# Patient Record
Sex: MALE | Race: WHITE | Employment: UNEMPLOYED | URBAN - METROPOLITAN AREA
[De-identification: names, ages, dates, MRNs, and addresses within clinical notes are randomized per-mention and may not be internally consistent; named-entity substitution may affect disease eponyms.]

---

## 2020-01-19 ENCOUNTER — APPOINTMENT (OUTPATIENT)
Dept: GENERAL RADIOLOGY | Age: 55
End: 2020-01-19
Payer: COMMERCIAL

## 2020-01-19 ENCOUNTER — HOSPITAL ENCOUNTER (OUTPATIENT)
Age: 55
Setting detail: OBSERVATION
Discharge: HOME | End: 2020-01-23
Attending: EMERGENCY MEDICINE | Admitting: HOSPITALIST
Payer: COMMERCIAL

## 2020-01-19 PROCEDURE — 99285 EMERGENCY DEPT VISIT HI MDM: CPT

## 2020-01-19 PROCEDURE — 71045 X-RAY EXAM CHEST 1 VIEW: CPT

## 2020-01-19 RX ORDER — ACETAMINOPHEN 500 MG
1000 TABLET ORAL ONCE
Status: COMPLETED | OUTPATIENT
Start: 2020-01-19 | End: 2020-01-20

## 2020-01-20 ENCOUNTER — APPOINTMENT (OUTPATIENT)
Dept: MRI IMAGING | Age: 55
End: 2020-01-20
Payer: COMMERCIAL

## 2020-01-20 ENCOUNTER — APPOINTMENT (OUTPATIENT)
Dept: CT IMAGING | Age: 55
End: 2020-01-20
Payer: COMMERCIAL

## 2020-01-20 PROBLEM — G45.9 TIA (TRANSIENT ISCHEMIC ATTACK): Status: ACTIVE | Noted: 2020-01-20

## 2020-01-20 PROBLEM — Z78.9 VEGETARIAN: Status: ACTIVE | Noted: 2020-01-20

## 2020-01-20 PROBLEM — R51.9 HEADACHE: Status: ACTIVE | Noted: 2020-01-20

## 2020-01-20 PROBLEM — R47.81 SLURRED SPEECH: Status: ACTIVE | Noted: 2020-01-20

## 2020-01-20 PROBLEM — R94.31 ABNORMAL EKG: Status: ACTIVE | Noted: 2020-01-20

## 2020-01-20 PROBLEM — I10 HYPERTENSION: Status: ACTIVE | Noted: 2020-01-20

## 2020-01-20 LAB
ALBUMIN SERPL-MCNC: 4.2 G/DL (ref 3.5–5.2)
ALP BLD-CCNC: 86 U/L (ref 40–129)
ALT SERPL-CCNC: 11 U/L (ref 0–40)
ANION GAP SERPL CALCULATED.3IONS-SCNC: 11 MMOL/L (ref 7–16)
AST SERPL-CCNC: 17 U/L (ref 0–39)
BASOPHILS ABSOLUTE: 0.03 E9/L (ref 0–0.2)
BASOPHILS RELATIVE PERCENT: 0.3 % (ref 0–2)
BILIRUB SERPL-MCNC: 1 MG/DL (ref 0–1.2)
BUN BLDV-MCNC: 19 MG/DL (ref 6–20)
CALCIUM SERPL-MCNC: 9.3 MG/DL (ref 8.6–10.2)
CHLORIDE BLD-SCNC: 108 MMOL/L (ref 98–107)
CHOLESTEROL, FASTING: 172 MG/DL (ref 0–199)
CO2: 24 MMOL/L (ref 22–29)
CREAT SERPL-MCNC: 0.9 MG/DL (ref 0.7–1.2)
EKG ATRIAL RATE: 79 BPM
EKG ATRIAL RATE: 85 BPM
EKG P AXIS: 48 DEGREES
EKG P-R INTERVAL: 162 MS
EKG P-R INTERVAL: 170 MS
EKG Q-T INTERVAL: 398 MS
EKG Q-T INTERVAL: 436 MS
EKG QRS DURATION: 124 MS
EKG QRS DURATION: 130 MS
EKG QTC CALCULATION (BAZETT): 473 MS
EKG QTC CALCULATION (BAZETT): 499 MS
EKG R AXIS: -148 DEGREES
EKG R AXIS: -34 DEGREES
EKG T AXIS: -130 DEGREES
EKG T AXIS: -50 DEGREES
EKG VENTRICULAR RATE: 79 BPM
EKG VENTRICULAR RATE: 85 BPM
EOSINOPHILS ABSOLUTE: 0.15 E9/L (ref 0.05–0.5)
EOSINOPHILS RELATIVE PERCENT: 1.7 % (ref 0–6)
GFR AFRICAN AMERICAN: >60
GFR NON-AFRICAN AMERICAN: >60 ML/MIN/1.73
GLUCOSE BLD-MCNC: 107 MG/DL (ref 74–99)
HBA1C MFR BLD: 5.3 % (ref 4–5.6)
HCT VFR BLD CALC: 48.4 % (ref 37–54)
HDLC SERPL-MCNC: 49 MG/DL
HEMOGLOBIN: 15.7 G/DL (ref 12.5–16.5)
IMMATURE GRANULOCYTES #: 0.02 E9/L
IMMATURE GRANULOCYTES %: 0.2 % (ref 0–5)
LDL CHOLESTEROL CALCULATED: 102 MG/DL (ref 0–99)
LV EF: 43 %
LVEF MODALITY: NORMAL
LYMPHOCYTES ABSOLUTE: 1.83 E9/L (ref 1.5–4)
LYMPHOCYTES RELATIVE PERCENT: 20.9 % (ref 20–42)
MCH RBC QN AUTO: 29.5 PG (ref 26–35)
MCHC RBC AUTO-ENTMCNC: 32.4 % (ref 32–34.5)
MCV RBC AUTO: 91 FL (ref 80–99.9)
METER GLUCOSE: 96 MG/DL (ref 74–99)
MONOCYTES ABSOLUTE: 0.74 E9/L (ref 0.1–0.95)
MONOCYTES RELATIVE PERCENT: 8.5 % (ref 2–12)
NEUTROPHILS ABSOLUTE: 5.98 E9/L (ref 1.8–7.3)
NEUTROPHILS RELATIVE PERCENT: 68.4 % (ref 43–80)
PDW BLD-RTO: 12.5 FL (ref 11.5–15)
PLATELET # BLD: 236 E9/L (ref 130–450)
PMV BLD AUTO: 10.8 FL (ref 7–12)
POTASSIUM REFLEX MAGNESIUM: 4.4 MMOL/L (ref 3.5–5)
RBC # BLD: 5.32 E12/L (ref 3.8–5.8)
SODIUM BLD-SCNC: 143 MMOL/L (ref 132–146)
TOTAL PROTEIN: 6.7 G/DL (ref 6.4–8.3)
TRIGLYCERIDE, FASTING: 106 MG/DL (ref 0–149)
TROPONIN: <0.01 NG/ML (ref 0–0.03)
TROPONIN: <0.01 NG/ML (ref 0–0.03)
VLDLC SERPL CALC-MCNC: 21 MG/DL
WBC # BLD: 8.8 E9/L (ref 4.5–11.5)

## 2020-01-20 PROCEDURE — 80053 COMPREHEN METABOLIC PANEL: CPT

## 2020-01-20 PROCEDURE — 80061 LIPID PANEL: CPT

## 2020-01-20 PROCEDURE — A9579 GAD-BASE MR CONTRAST NOS,1ML: HCPCS | Performed by: RADIOLOGY

## 2020-01-20 PROCEDURE — 6370000000 HC RX 637 (ALT 250 FOR IP)

## 2020-01-20 PROCEDURE — 99244 OFF/OP CNSLTJ NEW/EST MOD 40: CPT | Performed by: INTERNAL MEDICINE

## 2020-01-20 PROCEDURE — 96372 THER/PROPH/DIAG INJ SC/IM: CPT

## 2020-01-20 PROCEDURE — 70498 CT ANGIOGRAPHY NECK: CPT

## 2020-01-20 PROCEDURE — 70450 CT HEAD/BRAIN W/O DYE: CPT

## 2020-01-20 PROCEDURE — G0378 HOSPITAL OBSERVATION PER HR: HCPCS

## 2020-01-20 PROCEDURE — 6360000002 HC RX W HCPCS: Performed by: HOSPITALIST

## 2020-01-20 PROCEDURE — 70553 MRI BRAIN STEM W/O & W/DYE: CPT

## 2020-01-20 PROCEDURE — 93010 ELECTROCARDIOGRAM REPORT: CPT | Performed by: INTERNAL MEDICINE

## 2020-01-20 PROCEDURE — 97165 OT EVAL LOW COMPLEX 30 MIN: CPT

## 2020-01-20 PROCEDURE — 6370000000 HC RX 637 (ALT 250 FOR IP): Performed by: EMERGENCY MEDICINE

## 2020-01-20 PROCEDURE — 85025 COMPLETE CBC W/AUTO DIFF WBC: CPT

## 2020-01-20 PROCEDURE — 92523 SPEECH SOUND LANG COMPREHEN: CPT

## 2020-01-20 PROCEDURE — 2580000003 HC RX 258: Performed by: HOSPITALIST

## 2020-01-20 PROCEDURE — 36415 COLL VENOUS BLD VENIPUNCTURE: CPT

## 2020-01-20 PROCEDURE — 84484 ASSAY OF TROPONIN QUANT: CPT

## 2020-01-20 PROCEDURE — 82962 GLUCOSE BLOOD TEST: CPT

## 2020-01-20 PROCEDURE — 6360000004 HC RX CONTRAST MEDICATION: Performed by: RADIOLOGY

## 2020-01-20 PROCEDURE — 99218 PR INITIAL OBSERVATION CARE/DAY 30 MINUTES: CPT | Performed by: PSYCHIATRY & NEUROLOGY

## 2020-01-20 PROCEDURE — 93005 ELECTROCARDIOGRAM TRACING: CPT | Performed by: EMERGENCY MEDICINE

## 2020-01-20 PROCEDURE — 70496 CT ANGIOGRAPHY HEAD: CPT

## 2020-01-20 PROCEDURE — 93005 ELECTROCARDIOGRAM TRACING: CPT | Performed by: INTERNAL MEDICINE

## 2020-01-20 PROCEDURE — 83036 HEMOGLOBIN GLYCOSYLATED A1C: CPT

## 2020-01-20 PROCEDURE — 93306 TTE W/DOPPLER COMPLETE: CPT

## 2020-01-20 PROCEDURE — 6370000000 HC RX 637 (ALT 250 FOR IP): Performed by: HOSPITALIST

## 2020-01-20 RX ORDER — ASPIRIN 81 MG/1
TABLET, CHEWABLE ORAL
Status: COMPLETED
Start: 2020-01-20 | End: 2020-01-20

## 2020-01-20 RX ORDER — SODIUM CHLORIDE 0.9 % (FLUSH) 0.9 %
10 SYRINGE (ML) INJECTION EVERY 12 HOURS SCHEDULED
Status: DISCONTINUED | OUTPATIENT
Start: 2020-01-20 | End: 2020-01-23 | Stop reason: HOSPADM

## 2020-01-20 RX ORDER — ASPIRIN 81 MG/1
81 TABLET ORAL DAILY
Status: DISCONTINUED | OUTPATIENT
Start: 2020-01-20 | End: 2020-01-23 | Stop reason: HOSPADM

## 2020-01-20 RX ORDER — ONDANSETRON 2 MG/ML
4 INJECTION INTRAMUSCULAR; INTRAVENOUS EVERY 6 HOURS PRN
Status: DISCONTINUED | OUTPATIENT
Start: 2020-01-20 | End: 2020-01-23 | Stop reason: HOSPADM

## 2020-01-20 RX ORDER — SODIUM CHLORIDE 0.9 % (FLUSH) 0.9 %
10 SYRINGE (ML) INJECTION PRN
Status: DISCONTINUED | OUTPATIENT
Start: 2020-01-20 | End: 2020-01-20 | Stop reason: SDUPTHER

## 2020-01-20 RX ORDER — SODIUM CHLORIDE 0.9 % (FLUSH) 0.9 %
10 SYRINGE (ML) INJECTION EVERY 12 HOURS SCHEDULED
Status: DISCONTINUED | OUTPATIENT
Start: 2020-01-20 | End: 2020-01-20 | Stop reason: SDUPTHER

## 2020-01-20 RX ORDER — ATORVASTATIN CALCIUM 40 MG/1
40 TABLET, FILM COATED ORAL NIGHTLY
Status: DISCONTINUED | OUTPATIENT
Start: 2020-01-20 | End: 2020-01-23 | Stop reason: HOSPADM

## 2020-01-20 RX ORDER — SODIUM CHLORIDE 0.9 % (FLUSH) 0.9 %
10 SYRINGE (ML) INJECTION PRN
Status: DISCONTINUED | OUTPATIENT
Start: 2020-01-20 | End: 2020-01-23 | Stop reason: HOSPADM

## 2020-01-20 RX ORDER — ACETAMINOPHEN 325 MG/1
650 TABLET ORAL EVERY 4 HOURS PRN
Status: DISCONTINUED | OUTPATIENT
Start: 2020-01-20 | End: 2020-01-23 | Stop reason: HOSPADM

## 2020-01-20 RX ADMIN — GADOTERIDOL 20 ML: 279.3 INJECTION, SOLUTION INTRAVENOUS at 16:40

## 2020-01-20 RX ADMIN — ENOXAPARIN SODIUM 40 MG: 40 INJECTION SUBCUTANEOUS at 10:44

## 2020-01-20 RX ADMIN — Medication 10 ML: at 22:35

## 2020-01-20 RX ADMIN — ACETAMINOPHEN 1000 MG: 500 TABLET ORAL at 00:20

## 2020-01-20 RX ADMIN — IOPAMIDOL 60 ML: 755 INJECTION, SOLUTION INTRAVENOUS at 02:49

## 2020-01-20 RX ADMIN — ASPIRIN 81 MG CHEWABLE TABLET 81 MG: 81 TABLET CHEWABLE at 10:44

## 2020-01-20 RX ADMIN — ATORVASTATIN CALCIUM 40 MG: 40 TABLET, FILM COATED ORAL at 22:34

## 2020-01-20 RX ADMIN — ASPIRIN 81 MG: 81 TABLET ORAL at 10:50

## 2020-01-20 RX ADMIN — Medication 10 ML: at 10:45

## 2020-01-20 SDOH — HEALTH STABILITY: MENTAL HEALTH: HOW OFTEN DO YOU HAVE A DRINK CONTAINING ALCOHOL?: MONTHLY OR LESS

## 2020-01-20 ASSESSMENT — PAIN SCALES - GENERAL
PAINLEVEL_OUTOF10: 3
PAINLEVEL_OUTOF10: 0

## 2020-01-20 NOTE — ED PROVIDER NOTES
HPI:  1/19/20,   Time: 11:33 PM       Amanda Brown is a 49 y.o. male presenting to the ED for headache, beginning 1 day ago. The complaint has been persistent, moderate in severity, and worsened by nothing. Patient states that he feels as if he has been having TIAs. The patient states that he gets a pressure sensation in his head followed by slurred speech to where he cannot find the right words he becomes agitated confused and curses at his wife. The patient states that he has had these episodes on and off for the last 2 years with the last one occurring last night. He states that his symptoms usually lasts any from where from 1 hour to 2-hour and then they resolve and he becomes himself. Patient states that today he has had continuation of his headache therefore he came to the ED for further evaluation. Review of Systems:   Pertinent positives and negatives are stated within HPI, all other systems reviewed and are negative.          --------------------------------------------- PAST HISTORY ---------------------------------------------  Past Medical History:  has no past medical history on file. Past Surgical History:  has no past surgical history on file. Social History:  reports that he has never smoked. He has never used smokeless tobacco.    Family History: family history is not on file. The patients home medications have been reviewed. Allergies: Patient has no known allergies.         ---------------------------------------------------PHYSICAL EXAM--------------------------------------    Constitutional/General: Alert and oriented x3, well appearing, non toxic in NAD  Head: Normocephalic and atraumatic  Eyes: PERRL, EOMI, conjunctive normal, sclera non icteric  Mouth: Oropharynx clear, handling secretions, no trismus, no asymmetry of the posterior oropharynx or uvular edema  Neck: Supple, full ROM, non tender to palpation in the midline, no stridor, no crepitus, no meningeal signs  Respiratory: Lungs clear to auscultation bilaterally, no wheezes, rales, or rhonchi. Not in respiratory distress  Cardiovascular:  Regular rate. Regular rhythm. No murmurs, gallops, or rubs. 2+ distal pulses  GI:  Abdomen Soft, Non tender, Non distended. +BS. No organomegaly, no palpable masses,  No rebound, guarding, or rigidity. Musculoskeletal: Moves all extremities x 4. Warm and well perfused, no clubbing, cyanosis, or edema. Capillary refill <3 seconds  Integument: skin warm and dry. No rashes. Neurologic: GCS 15, no focal deficits, cranial nerves II to XII intact, symmetric strength 5/5 in the upper and lower extremities bilaterally  Psychiatric: Normal Affect    -------------------------------------------------- RESULTS -------------------------------------------------  I have personally reviewed all laboratory and imaging results for this patient. Results are listed below.      LABS:  Results for orders placed or performed during the hospital encounter of 01/19/20   CBC Auto Differential   Result Value Ref Range    WBC 8.8 4.5 - 11.5 E9/L    RBC 5.32 3.80 - 5.80 E12/L    Hemoglobin 15.7 12.5 - 16.5 g/dL    Hematocrit 48.4 37.0 - 54.0 %    MCV 91.0 80.0 - 99.9 fL    MCH 29.5 26.0 - 35.0 pg    MCHC 32.4 32.0 - 34.5 %    RDW 12.5 11.5 - 15.0 fL    Platelets 232 952 - 649 E9/L    MPV 10.8 7.0 - 12.0 fL    Neutrophils % 68.4 43.0 - 80.0 %    Immature Granulocytes % 0.2 0.0 - 5.0 %    Lymphocytes % 20.9 20.0 - 42.0 %    Monocytes % 8.5 2.0 - 12.0 %    Eosinophils % 1.7 0.0 - 6.0 %    Basophils % 0.3 0.0 - 2.0 %    Neutrophils Absolute 5.98 1.80 - 7.30 E9/L    Immature Granulocytes # 0.02 E9/L    Lymphocytes Absolute 1.83 1.50 - 4.00 E9/L    Monocytes Absolute 0.74 0.10 - 0.95 E9/L    Eosinophils Absolute 0.15 0.05 - 0.50 E9/L    Basophils Absolute 0.03 0.00 - 0.20 E9/L   Comprehensive Metabolic Panel w/ Reflex to MG   Result Value Ref Range    Sodium 143 132 - 146 mmol/L    Potassium reflex Magnesium 4.4 3.5 - 5.0 mmol/L    Chloride 108 (H) 98 - 107 mmol/L    CO2 24 22 - 29 mmol/L    Anion Gap 11 7 - 16 mmol/L    Glucose 107 (H) 74 - 99 mg/dL    BUN 19 6 - 20 mg/dL    CREATININE 0.9 0.7 - 1.2 mg/dL    GFR Non-African American >60 >=60 mL/min/1.73    GFR African American >60     Calcium 9.3 8.6 - 10.2 mg/dL    Total Protein 6.7 6.4 - 8.3 g/dL    Alb 4.2 3.5 - 5.2 g/dL    Total Bilirubin 1.0 0.0 - 1.2 mg/dL    Alkaline Phosphatase 86 40 - 129 U/L    ALT 11 0 - 40 U/L    AST 17 0 - 39 U/L   Troponin   Result Value Ref Range    Troponin <0.01 0.00 - 0.03 ng/mL   EKG 12 Lead   Result Value Ref Range    Ventricular Rate 85 BPM    Atrial Rate 85 BPM    P-R Interval 162 ms    QRS Duration 124 ms    Q-T Interval 398 ms    QTc Calculation (Bazett) 473 ms    R Axis -148 degrees    T Axis -50 degrees       RADIOLOGY:  Interpreted by Radiologist.  CT Head WO Contrast   Final Result   No acute intracranial process. This report has been electronically signed by Stacie Moore MD.      CTA Head W Contrast   Final Result   Negative head CTA. No evidence of intracranial large vessel stenosis or    occlusion. No evidence of aneurysm or AVM. This report has been electronically signed by Stacie Moore MD.      CTA Neck W Contrast   Final Result   Negative neck CTA. No evidence of great vessel stenosis or occlusion. No    evidence of dissection. COMMENT:    Reference per NASCET criteria for degree of stenosis: Mild: less than 50%    stenosis. Moderate: 50-69% stenosis. Severe: 70-94% stenosis. Near occlusion:    95-99% stenosis. This report has been electronically signed by Stacie Moore MD.      XR CHEST PORTABLE   Final Result   No acute cardiopulmonary process. EKG: This EKG is signed and interpreted by the EP. EKG shows normal sinus rhythm at 85 bpm.  Right superior axis deviation. T wave inversions noted in lead III as well as aVF. Patient also has T wave inversions in V3 V4 V5 V6. No significant ST elevation or depression. No STEMI.      ------------------------- NURSING NOTES AND VITALS REVIEWED ---------------------------   The nursing notes within the ED encounter and vital signs as below have been reviewed by myself. BP (!) 159/89   Pulse 84   Temp 97.4 °F (36.3 °C) (Temporal)   Resp 17   Ht 5' 10\" (1.778 m)   Wt 220 lb (99.8 kg)   SpO2 99%   BMI 31.57 kg/m²   Oxygen Saturation Interpretation: Normal    The patients available past medical records and past encounters were reviewed. ------------------------------ ED COURSE/MEDICAL DECISION MAKING----------------------  Medications   sodium chloride flush 0.9 % injection 10 mL (has no administration in time range)   sodium chloride flush 0.9 % injection 10 mL (has no administration in time range)   acetaminophen (TYLENOL) tablet 650 mg (has no administration in time range)   enoxaparin (LOVENOX) injection 40 mg (has no administration in time range)   acetaminophen (TYLENOL) tablet 1,000 mg (1,000 mg Oral Given 1/20/20 0020)   iopamidol (ISOVUE-370) 76 % injection 60 mL (60 mLs Intravenous Given 1/20/20 0249)         ED COURSE:       Medical Decision Making: This is a 30-year-old male presented to the ED for a headache and slurred speech. Arrival to the ED the patient had a NIH is 0 and had no neurological dysfunction. The patient went laboratory work which revealed a normal CBC. Normal chemistry. Negative troponin. EKG did show diffuse ST and T wave changes. CT as well as CTAs were unremarkable for any acute findings. Chest x-ray unremarkable. The patient currently denies any chest pain. The patient having questionable TIA events as well as an abnormal EKG the patient be admitted for further work-up. Case discussed with Dr. Malorie Armendariz who is agreed for admission.       This patient's ED course included: a personal history and physicial examination and re-evaluation prior to disposition    This patient has remained

## 2020-01-20 NOTE — PROGRESS NOTES
problem list, as listed below have been reviewed prior to initiation of this evaluation.      ADMITTING DIAGNOSIS: Slurred speech [R47.81]  Slurred speech [R47.81]     ACTIVE PROBLEM LIST:   Patient Active Problem List   Diagnosis    Slurred speech    Hypertension    Headache    TIA (transient ischemic attack)    Abnormal EKG    Vegetarian

## 2020-01-20 NOTE — CONSULTS
Cardiology consult  Dr. Dahlia Lott      Reason for Consult: Abnormal EKG  Requesting Physician: Nash Cornell MD  CHIEF COMPLAINT: Headache, slurred speech  History Obtained From: patient  HISTORY OF PRESENT ILLNESS:   Patient is a 49years old male with history of hypertension, GERD, was admitted to the hospital with headache and slurred speech, patient was found to have abnormal EKG, cardiology was consulted. Patient stated for the last couple of years has been having episodes headache described as pressure, associated symptoms included aphasia difficulty finding words, agitation, symptoms started suddenly, no aggravating or alleviating factors, he did not try any treatment for his symptoms, on the day of admission symptoms are significant enough that prompted him to seek medical attention, patient denies any chest pain, no shortness of breath, no lightheadedness, no dizziness, no palpitations, no pedal edema, no PND, no orthopnea, no syncope, no presyncopal episodes. Past Medical History:   Diagnosis Date    GERD (gastroesophageal reflux disease)     HTN (hypertension)     Vegetarian diet        History reviewed. No pertinent surgical history.       Current Facility-Administered Medications:     acetaminophen (TYLENOL) tablet 650 mg, 650 mg, Oral, Q4H PRN, Dio Daniels MD    enoxaparin (LOVENOX) injection 40 mg, 40 mg, Subcutaneous, Daily, Dio Daniels MD    atorvastatin (LIPITOR) tablet 40 mg, 40 mg, Oral, Nightly, Dio Daniels MD    sodium chloride flush 0.9 % injection 10 mL, 10 mL, Intravenous, 2 times per day, Dio Daniels MD    sodium chloride flush 0.9 % injection 10 mL, 10 mL, Intravenous, PRN, Dio Daniels MD    magnesium hydroxide (MILK OF MAGNESIA) 400 MG/5ML suspension 30 mL, 30 mL, Oral, Daily PRN, Dio Daniels MD    ondansetron (ZOFRAN) injection 4 mg, 4 mg, Intravenous, Q6H PRN, Dio Daniels MD    aspirin EC tablet 81 mg, 81 mg, Oral, Daily, Nash Cornell MD  [facility] per HPI  GASTROINTESTINAL:  negative for nausea, vomiting, diarrhea, constipation, pruritus and jaundice  GENITOURINARY:  negative for frequency, dysuria, nocturia, urinary incontinence and hesitancy  HEMATOLOGIC/LYMPHATIC:  negative for easy bruising, bleeding, lymphadenopathy and petechiae  ALLERGIC/IMMUNOLOGIC:  negative for urticaria, hay fever and angioedema  ENDOCRINE:  negative for heat intolerance, cold intolerance, tremor, hair loss and diabetic symptoms including neither polyuria nor polydipsia nor blurred vision  MUSCULOSKELETAL:  negative for  myalgias, arthralgias, joint swelling, stiff joints and decreased range of motion  NEUROLOGICAL:  negative for memory problems,visual disturbance, dysphagia, weakness and numbness      PHYSICAL EXAM:   CONSTITUTIONAL:  awake, alert, cooperative, no apparent distress, and appears stated age  EYES:  lids and lashes normal and pupils equal, round and reactive to light, anicteric sclerae  HEAD:  normocepalic, without obvious abnormality, atraumatic, pink, moist mucous membranes. NECK:  Supple, symmetrical, trachea midline, no adenopathy, thyroid symmetric, not enlarged and no tenderness, skin normal  HEMATOLOGIC/LYMPHATICS:  no cervical lymphadenopathy and no supraclavicular lymphadenopathy  LUNGS:  No increased work of breathing, good air exchange, clear to auscultation bilaterally, no crackles or wheezing  CARDIOVASCULAR:  Normal apical impulse, regular rate and rhythm, normal S1 and S2, no S3 or S4, 2/6 systolic murmur at the apex, no JVD, no carotid bruit, no pedal edema, good carotid upstroke bilaterally. ABDOMEN:  Soft, nontender, no masses, no hepatomegaly or splenomegaly, BS+  CHEST: nontender to palpation, expands symmetrically  MUSCULOSKELETAL:  No clubbing no cyanosis. there is no redness, warmth, or swelling of the joints  full range of motion noted  NEUROLOGIC:  Alert, awake,oriented x3, no focal neurologic deficit was appreciated  SKIN:  no bruising or vessel stenosis or occlusion. No    evidence of dissection. COMMENT:    Reference per NASCET criteria for degree of stenosis: Mild: less than 50%    stenosis. Moderate: 50-69% stenosis. Severe: 70-94% stenosis. Near occlusion:    95-99% stenosis. This report has been electronically signed by Merry Duvall MD.      XR CHEST PORTABLE   Final Result   No acute cardiopulmonary process. I have personally reviewed the laboratory, cardiac diagnostic and radiographic testing as outlined above:      IMPRESSION:  1. Abnormal EKG: Asymptomatic from cardiac standpoint, evaluation. 2.  Hypertension: Controlled  3. Headache and aphasia  4. Obesity: Low-calorie diet, increase physical activities and weight loss were all advised    RECOMMENDATIONS:   1. Will continue current treatment  2.  Echocardiogram  3. Further cardiac recommendations will be forthcoming pending his clinical course and diagnostic test findings    I have reviewed my findings and recommendations with patient    Thank you for the consult  Electronically signed by Keith Guaman MD on 1/20/2020 at 9:13 AM  NOTE: This report was transcribed using voice recognition software.  Every effort was made to ensure accuracy; however, inadvertent computerized transcription errors may be present

## 2020-01-20 NOTE — ED NOTES
Patient:   DOLORES DONG            MRN: [id number]            FIN: [id number]              Age:   46 years     Sex:  FEMALE     :  [birth year]   Associated Diagnoses:   None   Author:   TEAGAN OCTTRELL     History and physical  47 y/o F with no significant PMH and PSH of dental surgery and lipoma removal presented to the ED with history of severe RLQ pain since the morning. Patient states that the pain started in the RLQ and got progressively worse throughout the day. It occasionally radiates to RUQ and is associated with nausea. Patient vomited once after arriving to the ED, emetus nb/nb. Patient also states that she noticed her stools being darker and stickier  today, but refused a rectal exam. Pt denies HA, dizziness, or burning on urination,.    Review of Systems   Constitutional symptoms:  No fever,    Skin symptoms:  Negative except as documented in HPI.   Eye symptoms:  Negative except as documented in HPI.   ENMT symptoms:  Negative except as documented in HPI.   Respiratory symptoms:  No shortness of breath, no cough.    Cardiovascular symptoms:  No chest pain, no tachycardia.    Gastrointestinal symptoms:  Abdominal pain, nausea, vomiting.    Genitourinary symptoms:  No dysuria, no vaginal discharge.    Musculoskeletal symptoms:  Negative except as documented in HPI.   Neurologic symptoms:  No headache, no altered level of consciousness.   Psychiatric symptoms:  Negative except as documented in HPI.   Endocrine symptoms:  Negative except as documented in HPI.   Hematologic/Lymphatic symptoms:  Negative except as documented in HPI.  Allergy/immunologic symptoms:  Negative except as documented in HPI.             Additional review of systems information: All other systems reviewed and otherwise negative.     Health Status   Allergies:    Allergic Reactions (All)  NKA.     Past Medical/ Family/ Social History     Medical history   Negative.   Medical history:    All Problems  Genital HSV /  Bed: 18B-18  Expected date:   Expected time:   Means of arrival:   Comments:  triage     Rich Dangelo RN  01/19/20 [id number] / Confirmed.   Surgical history: Negative.   Surgical history:    No active procedure history items have been selected or recorded..  Family history: Not significant.   Family history: Include Family History   No family history items have been selected or recorded..   Social history: Alcohol use: Denies, Tobacco use: Denies, Drug use: Denies.  Social history:    Social & Psychosocial Habits    No Data Available      Vitals between:   03-JUL-2019 03:17:47   TO   04-JUL-2019 03:17:47                   LAST RESULT MINIMUM MAXIMUM  Temperature 37.2 37.2 37.2  Heart Rate 92 92 94  Respiratory Rate 18 16 18  NISBP           143 143 145  NIDBP           90 90 91  NIMBP           108 108 109  SpO2                    100 100 100     Physical exam:  General: AAOx3, sleepy and irritable  Resp: CTAB  Cards: RRR, no rubs or gallops  GI: non-distended, with volutary guarding. Severe point tenderness in RLQ. Patient feels pain in RLQ with deep palpation in RUQ and LLQ. Obturator sign positive, Rovsing sign positive. Patient also had pain in RLQ as the head of the bed was moved down for the exam.  : normal urine output    Labs between:  03-JUL-2019 03:17 to 04-JUL-2019 03:17    CBC:                 WBC  HgB  Hct  Plt  MCV  RDW   04-JUL-2019 (H) 12.3  (L) 10.1  (L) 32.4  360  80.0  (H) 16.4     DIFF:                 Seg  Neutroph//ABS  Lymph//ABS  Mono//ABS  EOS/ABS  04-JUL-2019 NOT APPLICABLE  85 // (H) 10.4  9 // 1.1 6 // 0.8 0 // (L) 0.0     BMP:                 Na  Cl  BUN  Glu   04-JUL-2019 (L) 134  102  10  (H) 110                              K  CO2  Cr  Ca                              3.9  25  0.55  9.0     CMP:                 AST  ALT  AlkPhos  Bili  Albumin   04-JUL-2019 17  17  69  0.3  (L) 3.5     CT scan was done  -official radiology read is not available at the time of writing this note  -Evidence of dilatation of the appendix, with an appendicolith and paracolic fluid collection                    Assessment/Plan     47 y/o F with no significant PMH and PSH of dental surgery and lipoma removal presented to the ED with history of severe RLQ pain since the morning.  Patient presentation, lab work, and CT scan are consistent with the diagnosis of acute appendicitis.    -Admit to OBS  -NPO/IVF  -Pain control prn  -Nausea control prn  -IV abx  -schedule for laparoscopic appendectomy    Jonathan Carrion MD  PGY-1 Surgery  Pager # [phone]

## 2020-01-20 NOTE — PROGRESS NOTES
This nurse perfect served Dr. Maria Fernanda Clark NP to inform of episode of non sustained tachycardia of 140, awaiting a response.

## 2020-01-20 NOTE — CONSULTS
Esequiel Macedo is a 49 y.o. man     Neurology consulted for pressure like headache, slurred speech, agitated     Past Medical History:     Past Medical History:   Diagnosis Date    GERD (gastroesophageal reflux disease)     HTN (hypertension)     Vegetarian diet        Past Surgical History:     History reviewed. No pertinent surgical history. Allergies:     Patient has no known allergies. Medications:     Prior to Admission medications    Not on File       Social History:     Social History     Tobacco Use    Smoking status: Former Smoker     Types: Cigarettes     Last attempt to quit: 2010     Years since quitting: 10.0    Smokeless tobacco: Never Used   Substance Use Topics    Alcohol use: Not Currently     Frequency: Monthly or less    Drug use: Never       Review of Systems:     No chest pain or palpitations  No SOB  No vertigo, lightheadedness or loss of consciousness  No falls, tripping or stumbling  No incontinence of bowels or bladder  No itching or bruising appreciated  No numbness, tingling or focal arm/leg weakness    ROS otherwise negative     Family History:     Family History   Problem Relation Age of Onset    Stroke Mother 52         brain aneurysm     Stroke Brother 52    Alzheimer's Disease Maternal Grandmother         History of Present Illness:     Mr. Namrata Chen he is 49years old male, have a history of hypertension (never seen a doctor for 15 years), presented to hospital for slurred speech, problem finding words, agitated, confused, feels like ?he has TIA. He stated he has significant family history with stroke, Alzheimer on both side of his mom and dad. Paternal grand dad with Albin, mom passed away with brain aneurysm, brother with minor stroke at 52years old. According to him, though symptom has been on and off for 2 years, episodic, last for 1 to 2 hours, during which. He has pressure-like headache, slurred speech and problem finding words.   He also was agitated, confused, currently his wife, which is not his normal self. Ordering that he denies any problem with memory, calculation, orientation, weakness, sensation, balance. Objective:   BP (!) 150/93   Pulse 80   Temp 97.6 °F (36.4 °C) (Temporal)   Resp 16   Ht 5' 10\" (1.778 m)   Wt 228 lb 9.6 oz (103.7 kg)   SpO2 97%   BMI 32.80 kg/m²        General appearance: alert, appears stated age and cooperative  Head: Normocephalic, without obvious abnormality, atraumatic  Eyes: conjunctivae/corneas clear. PERRL, EOM's intact. Fundi benign.   Ears: normal TM's and external ear canals both ears  Neck: no adenopathy, no carotid bruit, no JVD, supple, symmetrical, trachea midline and thyroid not enlarged, symmetric, no tenderness/mass/nodules  Lungs: clear to auscultation bilaterally  Chest wall: no tenderness  Heart: regular rate and rhythm, S1, S2 normal, no murmur, click, rub or gallop  Abdomen: soft, non-tender; bowel sounds normal; no masses,  no organomegaly  Neurologic: Grossly normal     Mental Status: Alert, oriented, thought content appropriate    Speech: clear  Language: appropriate     Cranial Nerves:  I: smell    II: visual acuity     II: visual fields Full    II: pupils CANDIS   III,VII: ptosis None   III,IV,VI: extraocular muscles  EOMI without nystagmus    V: mastication Normal   V: facial light touch sensation  Normal   V,VII: corneal reflex  Present   VII: facial muscle function - upper     VII: facial muscle function - lower Normal   VIII: hearing Normal   IX: soft palate elevation  Normal   IX,X: gag reflex Present   XI: trapezius strength  5/5   XI: sternocleidomastoid strength 5/5   XI: neck extension strength  5/5   XII: tongue strength  Normal     Motor:  Right   5/5              Left   5/5               Right Bicep  5/5           Left Bicep  5/5              Right Triceps   5/5       Left Triceps  5/5          Right Deltoid  5/5     Left Deltoid  5/5         Right IPS  5/5            Left IPS

## 2020-01-20 NOTE — PROGRESS NOTES
Physical Therapy      PT orders received and chart reviewed. Pt politely declines PT eval stating he feels all symptoms have resolved and has no concerns regarding mobility during admission or upon d/c to home. Pt reporting he has been OOB and amb to/from restroom without issues. PT will discontinue at this time, thank you.     Serene Chapin, PT, DPT  [id number]

## 2020-01-20 NOTE — H&P
deficits noted. Negative CT head. Treated with 1 gm of tylenol for headache. Past Medical History:          Diagnosis Date    GERD (gastroesophageal reflux disease)     HTN (hypertension)     Vegetarian diet        Past Surgical History:      History reviewed. No pertinent surgical history. Medications Prior to Admission:      Prior to Admission medications    Not on File       Allergies:  Patient has no known allergies. Social History:      The patient currently lives home with his spouse and is unemployed. 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 TOBACCO:   reports that he quit smoking about 10 years ago. His smoking use included cigarettes. He has never used smokeless tobacco.  ETOH:   reports previous alcohol use. Family History:       Reviewed in detail and negative for DM, CAD, Cancer, CVA. Positive as follows:        Problem Relation Age of Onset    Stroke Mother 52         brain aneurysm     Stroke Brother 52    Alzheimer's Disease Maternal Grandmother      REVIEW OF SYSTEMS:   Pertinent positives as noted in the HPI. All other systems reviewed and negative. PHYSICAL EXAM:    BP (!) 150/93   Pulse 80   Temp 97.6 °F (36.4 °C) (Temporal)   Resp 16   Ht 5' 10\" (1.778 m)   Wt 228 lb 9.6 oz (103.7 kg)   SpO2 97%   BMI 32.80 kg/m²     General appearance:  No apparent distress, appears stated age and cooperative. HEENT:  Normal cephalic, atraumatic without obvious deformity. Pupils equal, round, and reactive to light. Extra ocular muscles intact. Conjunctivae/corneas clear. Neck: Supple, with full range of motion. No jugular venous distention. Trachea midline. Respiratory:  Normal respiratory effort. Clear to auscultation, bilaterally without Rales/Wheezes/Rhonchi. Cardiovascular:  Regular rate and rhythm with normal S1/S2 without murmurs, rubs or gallops. Abdomen: Soft, non-tender, non-distended with normal bowel sounds.   Musculoskeletal:  No clubbing, cyanosis or edema bilaterally. Full range of motion without deformity. Skin: Skin color, texture, turgor normal.  No rashes or lesions. Neurologic:  Neurovascularly intact without any focal sensory/motor deficits. Psychiatric:  Alert and oriented, thought content appropriate, normal insight  Capillary Refill: Brisk,< 3 seconds   Peripheral Pulses: +2 palpable, equal bilaterally     Ct Head Wo Contrast  Result Date: 1/20/2020  No acute intracranial process. This report has been electronically signed by Junaid Santacruz MD.    Xr Chest Portable  Result Date: 1/19/2020  No acute cardiopulmonary process. Cta Neck W Contrast  Result Date: 1/20/2020  Negative neck CTA. No evidence of great vessel stenosis or occlusion. No evidence of dissection. COMMENT: Reference per NASCET criteria for degree of stenosis: Mild: less than 50% stenosis. Moderate: 50-69% stenosis. Severe: 70-94% stenosis. Near occlusion: 95-99% stenosis. This report has been electronically signed by Junaid Santacruz MD.    Cta Head W Contrast  Result Date: 1/20/2020  Negative head CTA. No evidence of intracranial large vessel stenosis or occlusion. No evidence of aneurysm or AVM. EKG:  I have reviewed the EKG with the following interpretation:   Normal sinus rhythm  Right superior axis deviation  Septal infarct , age undetermined  Nonspecific T wave abnormality  Abnormal ECG  No previous ECGs available    Labs:     Recent Labs     01/20/20  0020   WBC 8.8   HGB 15.7   HCT 48.4        Recent Labs     01/20/20  0020      K 4.4   *   CO2 24   BUN 19   CREATININE 0.9   CALCIUM 9.3     Recent Labs     01/20/20  0020   AST 17   ALT 11   BILITOT 1.0   ALKPHOS 86     No results for input(s): INR in the last 72 hours.   Recent Labs     01/20/20  0020   TROPONINI <0.01           ASSESSMENT:    Active Hospital Problems    Diagnosis Date Noted    Slurred speech [R47.81] 01/20/2020    Hypertension [I10] 01/20/2020    Headache [R51] 01/20/2020    TIA (transient ischemic attack) [G45.9] 01/20/2020    Abnormal EKG [R94.31] 01/20/2020    Vegetarian [Z78.9] 01/20/2020       PLAN:    Admit observation  MRI with and without contrast  Cardiology consult   Neurology consult  Aspirin 81mg daily  Statin  Neuro checks    DVT Prophylaxis: lovenox 40mg SQ  Diet: DIET CARDIAC; Vegetarian (Lacto-Ovo)  Code Status: Full Code    PT/OT Eval Status: N/A    Dispo - to home when stable       Macie Peralta, APRN - CNP    Thank you No primary care provider on file. for the opportunity to be involved in this patient's care. If you have any questions or concerns please feel free to contact me through [employer].

## 2020-01-21 ENCOUNTER — APPOINTMENT (OUTPATIENT)
Dept: NEUROLOGY | Age: 55
End: 2020-01-21
Payer: COMMERCIAL

## 2020-01-21 LAB
ANION GAP SERPL CALCULATED.3IONS-SCNC: 14 MMOL/L (ref 7–16)
BASOPHILS ABSOLUTE: 0.03 E9/L (ref 0–0.2)
BASOPHILS RELATIVE PERCENT: 0.5 % (ref 0–2)
BUN BLDV-MCNC: 13 MG/DL (ref 6–20)
CALCIUM SERPL-MCNC: 9.1 MG/DL (ref 8.6–10.2)
CHLORIDE BLD-SCNC: 106 MMOL/L (ref 98–107)
CO2: 21 MMOL/L (ref 22–29)
CREAT SERPL-MCNC: 0.8 MG/DL (ref 0.7–1.2)
EOSINOPHILS ABSOLUTE: 0.16 E9/L (ref 0.05–0.5)
EOSINOPHILS RELATIVE PERCENT: 2.6 % (ref 0–6)
GFR AFRICAN AMERICAN: >60
GFR NON-AFRICAN AMERICAN: >60 ML/MIN/1.73
GLUCOSE BLD-MCNC: 89 MG/DL (ref 74–99)
HCT VFR BLD CALC: 49.3 % (ref 37–54)
HEMOGLOBIN: 15.6 G/DL (ref 12.5–16.5)
IMMATURE GRANULOCYTES #: 0.02 E9/L
IMMATURE GRANULOCYTES %: 0.3 % (ref 0–5)
LYMPHOCYTES ABSOLUTE: 1.52 E9/L (ref 1.5–4)
LYMPHOCYTES RELATIVE PERCENT: 24.5 % (ref 20–42)
MAGNESIUM: 2 MG/DL (ref 1.6–2.6)
MCH RBC QN AUTO: 29 PG (ref 26–35)
MCHC RBC AUTO-ENTMCNC: 31.6 % (ref 32–34.5)
MCV RBC AUTO: 91.6 FL (ref 80–99.9)
MONOCYTES ABSOLUTE: 0.59 E9/L (ref 0.1–0.95)
MONOCYTES RELATIVE PERCENT: 9.5 % (ref 2–12)
NEUTROPHILS ABSOLUTE: 3.88 E9/L (ref 1.8–7.3)
NEUTROPHILS RELATIVE PERCENT: 62.6 % (ref 43–80)
PDW BLD-RTO: 12.6 FL (ref 11.5–15)
PLATELET # BLD: 221 E9/L (ref 130–450)
PMV BLD AUTO: 10.9 FL (ref 7–12)
POTASSIUM REFLEX MAGNESIUM: 4 MMOL/L (ref 3.5–5)
RBC # BLD: 5.38 E12/L (ref 3.8–5.8)
SODIUM BLD-SCNC: 141 MMOL/L (ref 132–146)
TSH SERPL DL<=0.05 MIU/L-ACNC: 0.59 UIU/ML (ref 0.27–4.2)
WBC # BLD: 6.2 E9/L (ref 4.5–11.5)

## 2020-01-21 PROCEDURE — 6370000000 HC RX 637 (ALT 250 FOR IP): Performed by: INTERNAL MEDICINE

## 2020-01-21 PROCEDURE — 6370000000 HC RX 637 (ALT 250 FOR IP): Performed by: HOSPITALIST

## 2020-01-21 PROCEDURE — 83735 ASSAY OF MAGNESIUM: CPT

## 2020-01-21 PROCEDURE — 99225 PR SBSQ OBSERVATION CARE/DAY 25 MINUTES: CPT | Performed by: NURSE PRACTITIONER

## 2020-01-21 PROCEDURE — 85025 COMPLETE CBC W/AUTO DIFF WBC: CPT

## 2020-01-21 PROCEDURE — G0378 HOSPITAL OBSERVATION PER HR: HCPCS

## 2020-01-21 PROCEDURE — 36415 COLL VENOUS BLD VENIPUNCTURE: CPT

## 2020-01-21 PROCEDURE — 80048 BASIC METABOLIC PNL TOTAL CA: CPT

## 2020-01-21 PROCEDURE — 95819 EEG AWAKE AND ASLEEP: CPT

## 2020-01-21 PROCEDURE — 99214 OFFICE O/P EST MOD 30 MIN: CPT | Performed by: INTERNAL MEDICINE

## 2020-01-21 PROCEDURE — 84443 ASSAY THYROID STIM HORMONE: CPT

## 2020-01-21 PROCEDURE — 2580000003 HC RX 258: Performed by: HOSPITALIST

## 2020-01-21 PROCEDURE — 95819 EEG AWAKE AND ASLEEP: CPT | Performed by: PSYCHIATRY & NEUROLOGY

## 2020-01-21 RX ORDER — ENALAPRIL MALEATE 10 MG/1
10 TABLET ORAL DAILY
Status: DISCONTINUED | OUTPATIENT
Start: 2020-01-21 | End: 2020-01-23 | Stop reason: HOSPADM

## 2020-01-21 RX ORDER — METOPROLOL SUCCINATE 25 MG/1
12.5 TABLET, EXTENDED RELEASE ORAL DAILY
Status: DISCONTINUED | OUTPATIENT
Start: 2020-01-22 | End: 2020-01-23 | Stop reason: HOSPADM

## 2020-01-21 RX ADMIN — ATORVASTATIN CALCIUM 40 MG: 40 TABLET, FILM COATED ORAL at 19:57

## 2020-01-21 RX ADMIN — ENALAPRIL MALEATE 10 MG: 10 TABLET ORAL at 13:53

## 2020-01-21 RX ADMIN — Medication 10 ML: at 08:03

## 2020-01-21 RX ADMIN — ASPIRIN 81 MG: 81 TABLET ORAL at 08:02

## 2020-01-21 RX ADMIN — ACETAMINOPHEN 650 MG: 325 TABLET, FILM COATED ORAL at 14:10

## 2020-01-21 RX ADMIN — ACETAMINOPHEN 650 MG: 325 TABLET, FILM COATED ORAL at 19:57

## 2020-01-21 RX ADMIN — Medication 10 ML: at 22:00

## 2020-01-21 ASSESSMENT — PAIN DESCRIPTION - PAIN TYPE: TYPE: ACUTE PAIN

## 2020-01-21 ASSESSMENT — PAIN SCALES - GENERAL
PAINLEVEL_OUTOF10: 3
PAINLEVEL_OUTOF10: 0
PAINLEVEL_OUTOF10: 3
PAINLEVEL_OUTOF10: 0

## 2020-01-21 ASSESSMENT — PAIN DESCRIPTION - DESCRIPTORS: DESCRIPTORS: HEADACHE

## 2020-01-21 ASSESSMENT — PAIN DESCRIPTION - FREQUENCY: FREQUENCY: INTERMITTENT

## 2020-01-21 ASSESSMENT — PAIN DESCRIPTION - PROGRESSION: CLINICAL_PROGRESSION: GRADUALLY WORSENING

## 2020-01-21 ASSESSMENT — PAIN DESCRIPTION - ONSET: ONSET: GRADUAL

## 2020-01-21 ASSESSMENT — PAIN - FUNCTIONAL ASSESSMENT: PAIN_FUNCTIONAL_ASSESSMENT: ACTIVITIES ARE NOT PREVENTED

## 2020-01-21 ASSESSMENT — PAIN DESCRIPTION - LOCATION: LOCATION: HEAD

## 2020-01-21 NOTE — PROGRESS NOTES
enlarged  Lungs: clear to auscultation bilaterally, unlabored breaths   Heart: regular rate and rhythm, NSR on tele  Extremities: normal, atraumatic, no cyanosis or edema  Pulses: 2+ and symmetric  Skin: color, texture, turgor normal---no rashes or lesions      Mental Status: Alert, oriented x4, thought content appropriate, pleasant and conversant    Appropriate attention/concentration  Intact fundus of knowledge  Intact memories    Speech: no dysarthria  Language: no aphasias    Cranial Nerves:  I: smell NA   II: visual acuity  NA   II: visual fields Full to confrontation   II: pupils PERRL   III,VII: ptosis None   III,IV,VI: extraocular muscles  EOMI without nystagmus    V: mastication Normal   V: facial light touch sensation  Normal   V,VII: corneal reflex     VII: facial muscle function - upper  Normal   VII: facial muscle function - lower Normal   VIII: hearing Normal   IX: soft palate elevation  Normal   IX,X: gag reflex    XI: trapezius strength  5/5   XI: sternocleidomastoid strength 5/5   XI: neck extension strength  5/5   XII: tongue strength  Normal     Motor:  5/5 throughout  Normal bulk and tone  No abnormal movements     Sensory:  LT intact    Coordination:   FN, FFM and JAMA intact  HKS exam intact     Gait:  Not tested     DTR:   Right Brachioradialis reflex 2+  Left Brachioradialis reflex 2+  Right Biceps reflex 2+  Left Biceps reflex 2+  Right Triceps reflex 2+  Left Triceps reflex 2+  Right Quadriceps reflex 2+  Left Quadriceps reflex 2+  Right Achilles reflex 2+  Left Achilles reflex 2+    No Babinskis  No Sutton's    No pathological reflexes    Laboratory/Radiology:     CBC:   Lab Results   Component Value Date    WBC 6.2 01/21/2020    RBC 5.38 01/21/2020    HGB 15.6 01/21/2020    HCT 49.3 01/21/2020    MCV 91.6 01/21/2020    MCH 29.0 01/21/2020    MCHC 31.6 01/21/2020    RDW 12.6 01/21/2020     01/21/2020    MPV 10.9 01/21/2020     CMP:    Lab Results   Component Value Date

## 2020-01-21 NOTE — PROGRESS NOTES
CO2 24 21*   BUN 19 13   CREATININE 0.9 0.8   CALCIUM 9.3 9.1       Recent Labs     01/20/20  0020   PROT 6.7   ALKPHOS 86   ALT 11   AST 17   BILITOT 1.0       No results for input(s): INR in the last 72 hours. Recent Labs     01/20/20  0020 01/20/20  1349   TROPONINI <0.01 <0.01       Other labs:  Lab Results   Component Value Date    HDL 49 01/20/2020    LDLCALC 102 (H) 01/20/2020    LABA1C 5.3 01/20/2020       Radiology:  Imaging studies reviewed today. ASSESSMENT:    Episodes of slurred speech  Hypertension  Chronic systolic heart failure  GERD    PLAN:      Start enalapril  Monitor blood pressure and adjust medications as needed  EEG  Neurology evaluation in progress  Cardiology consultation  Continue aspirin  Continue atorvastatin  Discussed with cardiology, stress test planned          Echocardiogram 1/20/120  Summary   No previous echo for comparison. Technically adequate study. Mild asymmetric septal hypertrophy. Ejection fraction is visually estimated at 40-45%. Mild anteroseptal wall hypokinesis   E/A flow reversal noted. Suggestive of diastolic dysfunction. Interatrial septum appears intact. Agitated saline injected for shunt evaluation. Mild mitral regurgitation is present. Diet: DIET CARDIAC; Vegetarian (Lacto-Ovo)  Code Status: Full Code    PT/OT Eval Status: [x] Ordered [] Evaluation noted [] Not applicable    DVT Prophylaxis: [x]Lovenox []Heparin []PCD [] [address] []Encouraged ambulation []N/A    Likely disposition when able:  [x]Home [] Home with [facility] [] SNF/MATT [] Acute Rehab [] LTAC []Other    +++++++++++++++++++++++++++++++++++++++++++++++++  Stevo Bui MD, Hospitalist  +++++++++++++++++++++++++++++++++++++++++++++++++  NOTE: This report was transcribed using voice recognition software.  Every effort was made to ensure accuracy; however, inadvertent computerized transcription errors may be present.

## 2020-01-22 ENCOUNTER — APPOINTMENT (OUTPATIENT)
Dept: NON INVASIVE DIAGNOSTICS | Age: 55
End: 2020-01-22
Payer: COMMERCIAL

## 2020-01-22 ENCOUNTER — APPOINTMENT (OUTPATIENT)
Dept: NUCLEAR MEDICINE | Age: 55
End: 2020-01-22
Payer: COMMERCIAL

## 2020-01-22 LAB
LV EF: 25 %
LVEF MODALITY: NORMAL
VITAMIN B-12: 286 PG/ML (ref 211–946)

## 2020-01-22 PROCEDURE — A9500 TC99M SESTAMIBI: HCPCS | Performed by: RADIOLOGY

## 2020-01-22 PROCEDURE — 6370000000 HC RX 637 (ALT 250 FOR IP): Performed by: INTERNAL MEDICINE

## 2020-01-22 PROCEDURE — 6360000002 HC RX W HCPCS: Performed by: INTERNAL MEDICINE

## 2020-01-22 PROCEDURE — 6370000000 HC RX 637 (ALT 250 FOR IP): Performed by: HOSPITALIST

## 2020-01-22 PROCEDURE — 3430000000 HC RX DIAGNOSTIC RADIOPHARMACEUTICAL: Performed by: RADIOLOGY

## 2020-01-22 PROCEDURE — 78452 HT MUSCLE IMAGE SPECT MULT: CPT

## 2020-01-22 PROCEDURE — 99212 OFFICE O/P EST SF 10 MIN: CPT | Performed by: INTERNAL MEDICINE

## 2020-01-22 PROCEDURE — G0378 HOSPITAL OBSERVATION PER HR: HCPCS

## 2020-01-22 PROCEDURE — 36415 COLL VENOUS BLD VENIPUNCTURE: CPT

## 2020-01-22 PROCEDURE — 2580000003 HC RX 258: Performed by: HOSPITALIST

## 2020-01-22 PROCEDURE — 93017 CV STRESS TEST TRACING ONLY: CPT

## 2020-01-22 PROCEDURE — 82607 VITAMIN B-12: CPT

## 2020-01-22 PROCEDURE — 93018 CV STRESS TEST I&R ONLY: CPT | Performed by: INTERNAL MEDICINE

## 2020-01-22 PROCEDURE — 93016 CV STRESS TEST SUPVJ ONLY: CPT | Performed by: INTERNAL MEDICINE

## 2020-01-22 RX ORDER — ENALAPRIL MALEATE 10 MG/1
10 TABLET ORAL DAILY
Qty: 90 TABLET | Refills: 1 | OUTPATIENT
Start: 2020-01-23

## 2020-01-22 RX ORDER — METOPROLOL SUCCINATE 25 MG/1
12.5 TABLET, EXTENDED RELEASE ORAL DAILY
Qty: 90 TABLET | Refills: 1 | OUTPATIENT
Start: 2020-01-23

## 2020-01-22 RX ADMIN — ASPIRIN 81 MG: 81 TABLET ORAL at 09:56

## 2020-01-22 RX ADMIN — Medication 34 MILLICURIE: at 14:09

## 2020-01-22 RX ADMIN — REGADENOSON 0.4 MG: 0.08 INJECTION, SOLUTION INTRAVENOUS at 14:06

## 2020-01-22 RX ADMIN — Medication 10.5 MILLICURIE: at 12:39

## 2020-01-22 RX ADMIN — ENALAPRIL MALEATE 10 MG: 10 TABLET ORAL at 09:56

## 2020-01-22 RX ADMIN — ATORVASTATIN CALCIUM 40 MG: 40 TABLET, FILM COATED ORAL at 20:53

## 2020-01-22 RX ADMIN — Medication 10 ML: at 20:53

## 2020-01-22 RX ADMIN — Medication 10 ML: at 09:56

## 2020-01-22 ASSESSMENT — PAIN SCALES - GENERAL: PAINLEVEL_OUTOF10: 0

## 2020-01-22 NOTE — PROGRESS NOTES
PORTABLE   Final Result   No acute cardiopulmonary process. NM Cardiac Stress Test Nuclear Imaging    (Results Pending)       Lab Review   Lab Results   Component Value Date     01/21/2020    K 4.0 01/21/2020     01/21/2020    CO2 21 01/21/2020    BUN 13 01/21/2020    CREATININE 0.8 01/21/2020    GLUCOSE 89 01/21/2020    CALCIUM 9.1 01/21/2020     Lab Results   Component Value Date    WBC 6.2 01/21/2020    HGB 15.6 01/21/2020    HCT 49.3 01/21/2020    MCV 91.6 01/21/2020     01/21/2020     I have personally reviewed the laboratory, cardiac diagnostic and radiographic testing as outlined above:    Assessment:     1. Abnormal EKG: Asymptomatic from cardiac standpoint, evaluation. 2.  Cardiomyopathy: Etiology?,  Will schedule for Lexiscan stress test to rule out ischemic etiology  3. Mild mitral valve regurgitation  4. Hypertension: Controlled  5. Headache and aphasia  6. Obesity:     Recommendations:     1. Lexiscan stress test tomorrow  2. Toprol-XL 12.5 mg daily  3. We will continue the rest of his medications  4. Further cardiac recommendations will be forthcoming pending his clinical course and diagnostic test findings    Discussed with patient  Discussed with nursing staff    Electronically signed by Nabila Bee MD on 1/21/2020 at 7:52 PM  NOTE: This report was transcribed using voice recognition software.  Every effort was made to ensure accuracy; however, inadvertent computerized transcription errors may be present

## 2020-01-22 NOTE — PROGRESS NOTES
Hospital Medicine Progress Note      Date of Admission: 1/19/2020  Hospital day # 0    Course: Follow-up on chronic systolic heart failure, episode of slurred speech with altered mental status, hypertension    Subjective    Clinically improving. Feeling better. Stable overnight. No other overnight issues reported. Exam:    /82   Pulse 81   Temp 98.7 °F (37.1 °C) (Temporal)   Resp 18   Ht 5' 10\" (1.778 m)   Wt 228 lb 9.6 oz (103.7 kg)   SpO2 98%   BMI 32.80 kg/m²     General appearance: No apparent distress, appears stated age and cooperative. HEENT: Pupils equal, round, and reactive to light. Conjunctivae/corneas clear. Neck: Supple. No jugular venous distention. Trachea midline. Respiratory:  Normal respiratory effort. Clear to auscultation, bilaterally without Rales/Wheezes/Rhonchi. Cardiovascular: Regular rate and rhythm with normal S1/S2 without murmurs, rubs or gallops. Abdomen: Soft, non-tender, non-distended with normal bowel sounds. Musculoskeletal: No clubbing, cyanosis or edema bilaterally. Brisk capillary refill. 2+ lower extremity pulses (dorsalis pedis).    Skin:  No rashes    Neurologic: awake, alert and following commands     Medications:  Reviewed    Infusion Medications   Scheduled Medications    enalapril  10 mg Oral Daily    metoprolol succinate  12.5 mg Oral Daily    enoxaparin  40 mg Subcutaneous Daily    atorvastatin  40 mg Oral Nightly    sodium chloride flush  10 mL Intravenous 2 times per day    aspirin  81 mg Oral Daily     PRN Meds: regadenoson, regadenoson, acetaminophen, sodium chloride flush, magnesium hydroxide, ondansetron, perflutren lipid microspheres    I/O    Intake/Output Summary (Last 24 hours) at 1/22/2020 0840  Last data filed at 1/22/2020 0400  Gross per 24 hour   Intake 610 ml   Output 825 ml   Net -215 ml       Labs:   Recent Labs     01/20/20  0020 01/21/20  0801   WBC 8.8 6.2   HGB 15.7 15.6   HCT 48.4 49.3    221       Recent Labs     01/20/20  0020 01/21/20  0801    141   K 4.4 4.0   * 106   CO2 24 21*   BUN 19 13   CREATININE 0.9 0.8   CALCIUM 9.3 9.1       Recent Labs     01/20/20  0020   PROT 6.7   ALKPHOS 86   ALT 11   AST 17   BILITOT 1.0       No results for input(s): INR in the last 72 hours. Recent Labs     01/20/20  0020 01/20/20  1349   TROPONINI <0.01 <0.01       Other labs:  Lab Results   Component Value Date    HDL 49 01/20/2020    LDLCALC 102 (H) 01/20/2020    TSH 0.590 01/21/2020    LABA1C 5.3 01/20/2020       Radiology:  Imaging studies reviewed today. ASSESSMENT:    Episodes of slurred speech  Hypertension  Chronic systolic heart failure  GERD    PLAN:      Start enalapril  Monitor blood pressure and adjust medications as needed  EEG  Neurology evaluation in progress  Cardiology consultation  Continue aspirin  Continue atorvastatin  Stress test planned today          Echocardiogram 1/20/120  Summary   No previous echo for comparison. Technically adequate study. Mild asymmetric septal hypertrophy. Ejection fraction is visually estimated at 40-45%. Mild anteroseptal wall hypokinesis   E/A flow reversal noted. Suggestive of diastolic dysfunction. Interatrial septum appears intact. Agitated saline injected for shunt evaluation. Mild mitral regurgitation is present. Diet: Diet NPO, After Midnight  Code Status: Full Code    PT/OT Eval Status: [x] Ordered [] Evaluation noted [] Not applicable    DVT Prophylaxis: [x]Lovenox []Heparin []PCD [] [address] []Encouraged ambulation []N/A    Likely disposition when able:  [x]Home [] Home with [facility] [] SNF/MATT [] Acute Rehab [] LTAC []Other    +++++++++++++++++++++++++++++++++++++++++++++++++  Marquis Lucy MD, Hospitalist  +++++++++++++++++++++++++++++++++++++++++++++++++  NOTE: This report was transcribed using voice recognition software.  Every effort was made to ensure accuracy; however, inadvertent computerized transcription errors may be

## 2020-01-23 VITALS
BODY MASS INDEX: 32.73 KG/M2 | HEART RATE: 97 BPM | OXYGEN SATURATION: 93 % | WEIGHT: 228.6 LBS | RESPIRATION RATE: 16 BRPM | DIASTOLIC BLOOD PRESSURE: 80 MMHG | TEMPERATURE: 97.6 F | SYSTOLIC BLOOD PRESSURE: 121 MMHG | HEIGHT: 70 IN

## 2020-01-23 PROCEDURE — 90686 IIV4 VACC NO PRSV 0.5 ML IM: CPT | Performed by: INTERNAL MEDICINE

## 2020-01-23 PROCEDURE — 6370000000 HC RX 637 (ALT 250 FOR IP): Performed by: INTERNAL MEDICINE

## 2020-01-23 PROCEDURE — 6370000000 HC RX 637 (ALT 250 FOR IP): Performed by: HOSPITALIST

## 2020-01-23 PROCEDURE — G0008 ADMIN INFLUENZA VIRUS VAC: HCPCS | Performed by: INTERNAL MEDICINE

## 2020-01-23 PROCEDURE — 6360000002 HC RX W HCPCS: Performed by: INTERNAL MEDICINE

## 2020-01-23 PROCEDURE — 2580000003 HC RX 258: Performed by: HOSPITALIST

## 2020-01-23 PROCEDURE — G0378 HOSPITAL OBSERVATION PER HR: HCPCS

## 2020-01-23 RX ORDER — ENALAPRIL MALEATE 10 MG/1
10 TABLET ORAL DAILY
Qty: 30 TABLET | Refills: 0 | Status: SHIPPED | OUTPATIENT
Start: 2020-01-24 | End: 2020-01-23

## 2020-01-23 RX ORDER — ASPIRIN 81 MG/1
81 TABLET ORAL DAILY
Qty: 30 TABLET | Refills: 0 | Status: SHIPPED | OUTPATIENT
Start: 2020-01-24 | End: 2020-03-25 | Stop reason: SDUPTHER

## 2020-01-23 RX ORDER — ENALAPRIL MALEATE 10 MG/1
10 TABLET ORAL DAILY
Qty: 30 TABLET | Refills: 0 | Status: SHIPPED | OUTPATIENT
Start: 2020-01-24 | End: 2020-02-27

## 2020-01-23 RX ORDER — METOPROLOL SUCCINATE 25 MG/1
12.5 TABLET, EXTENDED RELEASE ORAL DAILY
Qty: 30 TABLET | Refills: 3 | Status: SHIPPED | OUTPATIENT
Start: 2020-01-24 | End: 2020-06-02 | Stop reason: SDUPTHER

## 2020-01-23 RX ORDER — ATORVASTATIN CALCIUM 40 MG/1
40 TABLET, FILM COATED ORAL NIGHTLY
Qty: 30 TABLET | Refills: 3 | Status: SHIPPED | OUTPATIENT
Start: 2020-01-23 | End: 2020-02-27

## 2020-01-23 RX ORDER — ATORVASTATIN CALCIUM 40 MG/1
40 TABLET, FILM COATED ORAL NIGHTLY
Qty: 30 TABLET | Refills: 3 | Status: SHIPPED | OUTPATIENT
Start: 2020-01-23 | End: 2020-01-23

## 2020-01-23 RX ORDER — METOPROLOL SUCCINATE 25 MG/1
12.5 TABLET, EXTENDED RELEASE ORAL DAILY
Qty: 30 TABLET | Refills: 3 | Status: SHIPPED | OUTPATIENT
Start: 2020-01-24 | End: 2020-01-23

## 2020-01-23 RX ORDER — ASPIRIN 81 MG/1
81 TABLET ORAL DAILY
Qty: 30 TABLET | Refills: 0 | Status: SHIPPED | OUTPATIENT
Start: 2020-01-24 | End: 2020-01-23

## 2020-01-23 RX ADMIN — ENALAPRIL MALEATE 10 MG: 10 TABLET ORAL at 09:16

## 2020-01-23 RX ADMIN — ASPIRIN 81 MG: 81 TABLET ORAL at 09:16

## 2020-01-23 RX ADMIN — ACETAMINOPHEN 650 MG: 325 TABLET, FILM COATED ORAL at 00:13

## 2020-01-23 RX ADMIN — METOPROLOL SUCCINATE 12.5 MG: 25 TABLET, EXTENDED RELEASE ORAL at 09:17

## 2020-01-23 RX ADMIN — INFLUENZA A VIRUS A/BRISBANE/02/2018 IVR-190 (H1N1) ANTIGEN (PROPIOLACTONE INACTIVATED), INFLUENZA A VIRUS A/KANSAS/14/2017 X-327 (H3N2) ANTIGEN (PROPIOLACTONE INACTIVATED), INFLUENZA B VIRUS B/MARYLAND/15/2016 ANTIGEN (PROPIOLACTONE INACTIVATED), INFLUENZA B VIRUS B/PHUKET/3073/2013 BVR-1B ANTIGEN (PROPIOLACTONE INACTIVATED) 0.5 ML: 15; 15; 15; 15 INJECTION, SUSPENSION INTRAMUSCULAR at 13:23

## 2020-01-23 RX ADMIN — Medication 10 ML: at 09:16

## 2020-01-23 ASSESSMENT — PAIN DESCRIPTION - FREQUENCY: FREQUENCY: INTERMITTENT

## 2020-01-23 ASSESSMENT — PAIN SCALES - GENERAL
PAINLEVEL_OUTOF10: 3
PAINLEVEL_OUTOF10: 0

## 2020-01-23 ASSESSMENT — PAIN DESCRIPTION - LOCATION: LOCATION: HEAD

## 2020-01-23 ASSESSMENT — PAIN DESCRIPTION - PAIN TYPE: TYPE: ACUTE PAIN

## 2020-01-23 ASSESSMENT — PAIN DESCRIPTION - DESCRIPTORS: DESCRIPTORS: HEADACHE

## 2020-01-23 ASSESSMENT — PAIN DESCRIPTION - ONSET: ONSET: ON-GOING

## 2020-01-23 NOTE — PROGRESS NOTES
awake,oriented x3  SKIN:  no bruising or bleeding, normal skin color, texture, turgor and no redness, warmth, or swelling      Cardiographics  I personally reviewed the telemetry monitor strips with the following interpretation: Sinus rhythm    Echocardiogram: Summary   No previous echo for comparison. Technically adequate study. Mild asymmetric septal hypertrophy. Ejection fraction is visually estimated at 40-45%. Mild anteroseptal wall hypokinesis   E/A flow reversal noted. Suggestive of diastolic dysfunction. Interatrial septum appears intact. Agitated saline injected for shunt evaluation. Mild mitral regurgitation is present. Imaging  NM Cardiac Stress Test Nuclear Imaging   Final Result   1. No reversible perfusion defect   2. Ejection fraction is 25 %. 3. Wall motion appears to be global hypokinesia          MRI BRAIN W WO CONTRAST   Final Result   1. No indication for an acute ischemic insult to the brain parenchyma. 2. No abnormal intracranial enhancements or disruption of blood/brain. 3. Discrete/small size scattered areas of hyperintensity in the white   matter of both cerebral hemispheres. Presently nonspecific finding. Findings to be correlate clinically. A follow-up study in 2 months   time interval can be helpful to determine stability time and space if   needed for clinical management. CT Head WO Contrast   Final Result   No acute intracranial process. This report has been electronically signed by Cyndee Galeazzi MD.      CTA Head W Contrast   Final Result   Negative head CTA. No evidence of intracranial large vessel stenosis or    occlusion. No evidence of aneurysm or AVM. This report has been electronically signed by Cyndee Galeazzi MD.      CTA Neck W Contrast   Final Result   Negative neck CTA. No evidence of great vessel stenosis or occlusion. No    evidence of dissection.        COMMENT:    Reference per NASCET criteria for degree of

## 2020-01-23 NOTE — PROGRESS NOTES
CLINICAL PHARMACY NOTE: MEDS TO [address] Select Patient?: No  Total # of Prescriptions Filled: 5   The following medications were delivered to the patient:  · Aspirin 81  · Atorvastatin 40  · Vitamin b-12 1000  · Metoprolol succinate er 25  · Enalapril 10  Total # of Interventions Completed: 3  Time Spent (min): 30    Additional Documentation:

## 2020-01-27 ENCOUNTER — TELEPHONE (OUTPATIENT)
Dept: FAMILY MEDICINE CLINIC | Age: 55
End: 2020-01-27

## 2020-01-27 ENCOUNTER — OFFICE VISIT (OUTPATIENT)
Dept: FAMILY MEDICINE CLINIC | Age: 55
End: 2020-01-27
Payer: COMMERCIAL

## 2020-01-27 VITALS
HEIGHT: 70 IN | WEIGHT: 230 LBS | HEART RATE: 85 BPM | OXYGEN SATURATION: 94 % | SYSTOLIC BLOOD PRESSURE: 130 MMHG | BODY MASS INDEX: 32.93 KG/M2 | TEMPERATURE: 98.3 F | DIASTOLIC BLOOD PRESSURE: 88 MMHG | RESPIRATION RATE: 14 BRPM

## 2020-01-27 PROCEDURE — 1036F TOBACCO NON-USER: CPT | Performed by: NURSE PRACTITIONER

## 2020-01-27 PROCEDURE — G8417 CALC BMI ABV UP PARAM F/U: HCPCS | Performed by: NURSE PRACTITIONER

## 2020-01-27 PROCEDURE — 99203 OFFICE O/P NEW LOW 30 MIN: CPT | Performed by: NURSE PRACTITIONER

## 2020-01-27 PROCEDURE — 3017F COLORECTAL CA SCREEN DOC REV: CPT | Performed by: NURSE PRACTITIONER

## 2020-01-27 PROCEDURE — G8482 FLU IMMUNIZE ORDER/ADMIN: HCPCS | Performed by: NURSE PRACTITIONER

## 2020-01-27 PROCEDURE — G8427 DOCREV CUR MEDS BY ELIG CLIN: HCPCS | Performed by: NURSE PRACTITIONER

## 2020-01-27 RX ORDER — ESCITALOPRAM OXALATE 5 MG/1
5 TABLET ORAL DAILY
Qty: 30 TABLET | Refills: 5 | Status: SHIPPED | OUTPATIENT
Start: 2020-01-27 | End: 2020-06-02

## 2020-01-27 RX ORDER — OMEPRAZOLE 20 MG/1
20 CAPSULE, DELAYED RELEASE ORAL
Qty: 90 CAPSULE | Refills: 1 | Status: SHIPPED | OUTPATIENT
Start: 2020-01-27 | End: 2020-06-02 | Stop reason: SDUPTHER

## 2020-01-27 ASSESSMENT — ENCOUNTER SYMPTOMS
EYES NEGATIVE: 1
ALLERGIC/IMMUNOLOGIC NEGATIVE: 1
RESPIRATORY NEGATIVE: 1
GASTROINTESTINAL NEGATIVE: 1

## 2020-01-27 ASSESSMENT — PATIENT HEALTH QUESTIONNAIRE - PHQ9
SUM OF ALL RESPONSES TO PHQ QUESTIONS 1-9: 2
SUM OF ALL RESPONSES TO PHQ9 QUESTIONS 1 & 2: 2
SUM OF ALL RESPONSES TO PHQ QUESTIONS 1-9: 2
2. FEELING DOWN, DEPRESSED OR HOPELESS: 1
1. LITTLE INTEREST OR PLEASURE IN DOING THINGS: 1

## 2020-01-27 NOTE — PROGRESS NOTES
Bam Burnette  is a 49 y.o. male  who presents today for    Chief Complaint   Patient presents with   Levi Sung Established New Doctor     needed new pcp    Follow-Up from [address]     got out thursday, his heart was weak because of his BP       HPI:  Pt is here for hospital f/u admission from 20. The following is an excerpt:  TIAs.  The patient states that he gets a pressure sensation in his head followed by slurred speech to where he cannot find the right words he becomes agitated confused and curses at his wife. [facility] patient states that he has had these episodes on and off for the last 2 years with the last one occurring last night. Karen Preet states that his symptoms usually lasts any from where from 1 hour to 2-hour and then they resolve and he becomes himself. Janay Cheng states that today he has had continuation of his headache therefore he came to the ED for further evaluation. He states these episodes usually occur in the evenings. When an episode occurs he lays down until it resolves. Nothing brings on the episodes. He has never been evaluated for these symptoms previously.      Slurred speech with a pressure feeling headache on left with right sided  neck pain. Got angry at times. Occurs once every 1-2 weeks. Lasted about 1-2 hours. No vision changes. No facial droop and no weakness. No increased ;ight sensitivity. No nausea. No chest pain  Has never seen anyone for this even though its been happening. He has not seen any physicians in over 5 years. States he has a family history of strokes, mother  from aneurysm age 52, brother positive stroke age 52 and states several uncles with strokes.      Temp 97.6, Respirs 12, HR 87, b/p 150/93. No neuro deficits noted. Negative CT head. Treated with 1 gm of tylenol for headache. The Pt is newly diagnosed with hypertension and started on antihypertensive medications. States he had issues with his BP while in [facility].  States he never sees doctors on a regular basis. Reviewed all diagnostic testing to include echocardiogram with EFF 22% with cardiomyopathy. Pt denies PND, orthopnea, shortness of breath or dyspnea. Brain MRI unremarkable except for scattered area of hyperintensity in the white matter of both cerebral hemispheres. . Recommend repeat in 2 months  Hypertension:  Patient is here for follow up chronic hypertension. This is not generally controlled on current medication regimen. BP today is130/88  Takes meds as directed and tolerates them well. Most recent labs reviewed with patient and ar unrmarkable  No symptoms from htn standpoint per ROS. Patient is not compliant with lifestyle modifications. Patient does not smoke. Comorbid conditions include dyslipidemia and obsity    BP Readings from Last 1 Encounters:   01/27/20 130/88    Recheck if >140/90  Hemoglobin A1C (%)   Date Value   01/20/2020 5.3     No results found for: LABMICR     Depression: Patient complains of depression. He complains of depressed mood, difficulty concentrating, fatigue, feelings of worthlessness/guilt, insomnia and psychomotor agitation. Onset was approximately several months ago, unchanged since that time. He denies current suicidal and homicidal plan or intent. Family history significant for schizophrenia with grandparents and some uncles. Possible organic causes contributing are: none. Risk factors: none Previous treatment includes none and none. Health Maintenace:    Have you had your Pneumonia Vaccine N/A    Have you had a Colorectal Screening  no    Have you had a Screening Mammogram  N/A    Have you seen any other physician or provider since your last visit no    Have you had any other diagnostic tests since your last visit? No    GERD: Patient complains of dyspepsia. Symptoms have been present for approximately several years. Symptoms include no other symptoms.  The patient denies belching and eructation, chest pain, difficulty swallowing, dysphagia, early satiety,

## 2020-01-31 ENCOUNTER — TELEPHONE (OUTPATIENT)
Dept: CARDIOLOGY CLINIC | Age: 55
End: 2020-01-31

## 2020-01-31 ENCOUNTER — TELEPHONE (OUTPATIENT)
Dept: ADMINISTRATIVE | Age: 55
End: 2020-01-31

## 2020-01-31 NOTE — TELEPHONE ENCOUNTER
Pt in [facility] Cardiology referral que for Cardiomyopathy due to hypertension, without heart failure ([facility] [address] Pt was seen by Dr. Juana Welch in hosp consultation on 1/20/20. Please advise regarding follow up.

## 2020-01-31 NOTE — TELEPHONE ENCOUNTER
DR Martín Downing WOULD LIKE TO SEE IN ONE MONTH. I TRIED CALLING THE PATIENT AND HIS VOICEMAIL IS FULL.   WILL ATTEMPT AGAIN

## 2020-02-23 NOTE — PROGRESS NOTES
education level: Not on file   Occupational History    Occupation: laid off   Social Needs    Financial resource strain: Not on file    Food insecurity:     Worry: Not on file     Inability: Not on file    Transportation needs:     Medical: Not on file     Non-medical: Not on file   Tobacco Use    Smoking status: Former Smoker     Types: Cigarettes     Last attempt to quit: 2010     Years since quitting: 10.1    Smokeless tobacco: Never Used   Substance and Sexual Activity    Alcohol use: Not Currently     Frequency: Monthly or less     Comment: 2 cups of coffee  a day     Drug use: Never    Sexual activity: Not on file   Lifestyle    Physical activity:     Days per week: Not on file     Minutes per session: Not on file    Stress: Not on file   Relationships    Social connections:     Talks on phone: Not on file     Gets together: Not on file     Attends [employer] service: Not on file     Active member of club or organization: Not on file     Attends meetings of clubs or organizations: Not on file     Relationship status: Not on file    Intimate partner violence:     Fear of current or ex partner: Not on file     Emotionally abused: Not on file     Physically abused: Not on file     Forced sexual activity: Not on file   Other Topics Concern    Not on file   Social History Narrative    Not on file       Family History   Problem Relation Age of Onset    Stroke Mother 52         brain aneurysm     Stroke Brother 52    Alzheimer's Disease Maternal Grandmother     Atrial Fibrillation Father        REVIEW OF SYSTEMS:     CONSTITUTIONAL:  negative for  fevers, chills, sweats, + fatigue  HEENT:  negative for  tinnitus, earaches, nasal congestion and epistaxis  RESPIRATORY:  negative for  dry cough, cough with sputum,wheezing and hemoptysis  GASTROINTESTINAL:  negative for nausea, vomiting, diarrhea, constipation, pruritus and jaundice  HEMATOLOGIC/LYMPHATIC:  negative for easy bruising, bleeding, reviewed the laboratory, cardiac diagnostic and radiographic testing as outlined above:      IMPRESSION:  1.  Cardiomyopathy: Nonischemic, recent negative Lexiscan stress test   2.  Hypertension: Not well controlled as per his report, will increase Vasotec to twice daily  3.  abnormal EKG: As above  4. Mild mitral valve regurgitation  5.  Headache and aphasia: No recurrence since hospital admission     RECOMMENDATIONS:   1. Increase Vasotec to twice daily  2. Continue the rest of medications  3. Preventive cardiology: Low-salt, low-cholesterol diet, daily exercise, were all advised. 4.  Get established with a PCP  5. Follow-up with Dr. Darci Lopez in 6 months, sooner if symptomatic for any reason    I have reviewed my findings and recommendations with patient    Electronically signed by Ro Waite MD on 2/27/2020 at 5:47 PM  NOTE: This report was transcribed using voice recognition software.  Every effort was made to ensure accuracy; however, inadvertent computerized transcription errors may be present

## 2020-02-27 ENCOUNTER — OFFICE VISIT (OUTPATIENT)
Dept: CARDIOLOGY CLINIC | Age: 55
End: 2020-02-27
Payer: COMMERCIAL

## 2020-02-27 VITALS
DIASTOLIC BLOOD PRESSURE: 88 MMHG | BODY MASS INDEX: 33.21 KG/M2 | WEIGHT: 232 LBS | HEIGHT: 70 IN | HEART RATE: 92 BPM | SYSTOLIC BLOOD PRESSURE: 128 MMHG

## 2020-02-27 PROCEDURE — 99213 OFFICE O/P EST LOW 20 MIN: CPT | Performed by: INTERNAL MEDICINE

## 2020-02-27 RX ORDER — ENALAPRIL MALEATE 10 MG/1
10 TABLET ORAL 2 TIMES DAILY
Qty: 180 TABLET | Refills: 2 | Status: SHIPPED | OUTPATIENT
Start: 2020-02-27 | End: 2020-06-02 | Stop reason: SDUPTHER

## 2020-03-26 RX ORDER — ASPIRIN 81 MG/1
81 TABLET ORAL DAILY
Qty: 90 TABLET | Refills: 1 | Status: SHIPPED | OUTPATIENT
Start: 2020-03-26 | End: 2020-06-02 | Stop reason: SDUPTHER

## 2020-03-27 ENCOUNTER — TELEPHONE (OUTPATIENT)
Dept: CARDIOLOGY CLINIC | Age: 55
End: 2020-03-27

## 2020-05-05 ENCOUNTER — TELEPHONE (OUTPATIENT)
Dept: CARDIOLOGY CLINIC | Age: 55
End: 2020-05-05

## 2020-05-22 ENCOUNTER — TELEPHONE (OUTPATIENT)
Dept: CARDIOLOGY CLINIC | Age: 55
End: 2020-05-22

## 2020-06-02 ENCOUNTER — VIRTUAL VISIT (OUTPATIENT)
Dept: CARDIOLOGY CLINIC | Age: 55
End: 2020-06-02
Payer: COMMERCIAL

## 2020-06-02 VITALS
SYSTOLIC BLOOD PRESSURE: 152 MMHG | BODY MASS INDEX: 31.5 KG/M2 | HEART RATE: 75 BPM | HEIGHT: 70 IN | DIASTOLIC BLOOD PRESSURE: 95 MMHG | WEIGHT: 220 LBS

## 2020-06-02 PROCEDURE — 99213 OFFICE O/P EST LOW 20 MIN: CPT | Performed by: INTERNAL MEDICINE

## 2020-06-02 RX ORDER — ENALAPRIL MALEATE 10 MG/1
10 TABLET ORAL 2 TIMES DAILY
Qty: 180 TABLET | Refills: 2 | Status: SHIPPED | OUTPATIENT
Start: 2020-06-02

## 2020-06-02 RX ORDER — ESCITALOPRAM OXALATE 5 MG/1
5 TABLET ORAL DAILY
COMMUNITY

## 2020-06-02 RX ORDER — METOPROLOL SUCCINATE 25 MG/1
12.5 TABLET, EXTENDED RELEASE ORAL DAILY
Qty: 90 TABLET | Refills: 3 | Status: SHIPPED | OUTPATIENT
Start: 2020-06-02

## 2020-06-02 RX ORDER — ASPIRIN 81 MG/1
81 TABLET ORAL DAILY
Qty: 180 TABLET | Refills: 1 | Status: SHIPPED | OUTPATIENT
Start: 2020-06-02

## 2020-06-02 RX ORDER — OMEPRAZOLE 20 MG/1
20 CAPSULE, DELAYED RELEASE ORAL
Qty: 90 CAPSULE | Refills: 1 | Status: SHIPPED | OUTPATIENT
Start: 2020-06-02

## 2020-06-02 NOTE — PROGRESS NOTES
[facility] Progress Note  Dr. Yancy Girard      Referring Physician: No primary care provider on file. CHIEF COMPLAINT: No chief complaint on file. HISTORY OF PRESENT ILLNESS:   Carson Carpenter is a 49 y.o. male evaluated via telephone on 6/2/2020 with  history of cardiomyopathy, abnormal EKG, hypertension, GERD. Blood pressure has been up and down, however he has not been taking his blood pressure consistently, patient denies any chest pain, no shortness of breath, no lightheadedness, no dizziness, no palpitations, no pedal edema, no PND, no orthopnea, no syncope, no presyncopal episodes. Past Medical History:   Diagnosis Date    GERD (gastroesophageal reflux disease)     HTN (hypertension)     Vegetarian diet          Past Surgical History:   Procedure Laterality Date    TONSILLECTOMY           Current Outpatient Medications   Medication Sig Dispense Refill    Blood Pressure Monitoring (SPHYGMOMANOMETER) MISC 1 Units by Does not apply route 2 times daily 1 each 0    aspirin 81 MG EC tablet Take 1 tablet by mouth daily 90 tablet 1    enalapril (VASOTEC) 10 MG tablet Take 1 tablet by mouth 2 times daily 180 tablet 2    omeprazole (PRILOSEC) 20 MG delayed release capsule Take 1 capsule by mouth every morning (before breakfast) 90 capsule 1    escitalopram (LEXAPRO) 5 MG tablet Take 1 tablet by mouth daily 30 tablet 5    metoprolol succinate (TOPROL XL) 25 MG extended release tablet Take 0.5 tablets by mouth daily 30 tablet 3    cyanocobalamin ([employer] VITAMIN B12) 1000 MCG tablet Take 1 tablet by mouth daily 30 tablet 0     No current facility-administered medications for this visit.           Allergies as of 06/02/2020    (No Known Allergies)       Social History     Socioeconomic History    Marital status:      Spouse name: Not on file    Number of children: Not on file    Years of education: Not on file    Highest education level: Not on file   Occupational History    Occupation: laid off   Social Needs    Financial resource strain: Not on file    Food insecurity     Worry: Not on file     Inability: Not on file    Transportation needs     Medical: Not on file     Non-medical: Not on file   Tobacco Use    Smoking status: Former Smoker     Types: Cigarettes     Last attempt to quit: 2010     Years since quitting: 10.4    Smokeless tobacco: Never Used   Substance and Sexual Activity    Alcohol use: Not Currently     Frequency: Monthly or less     Comment: 2 cups of coffee  a day     Drug use: Never    Sexual activity: Not on file   Lifestyle    Physical activity     Days per week: Not on file     Minutes per session: Not on file    Stress: Not on file   Relationships    Social connections     Talks on phone: Not on file     Gets together: Not on file     Attends Church service: Not on file     Active member of club or organization: Not on file     Attends meetings of clubs or organizations: Not on file     Relationship status: Not on file    Intimate partner violence     Fear of current or ex partner: Not on file     Emotionally abused: Not on file     Physically abused: Not on file     Forced sexual activity: Not on file   Other Topics Concern    Not on file   Social History Narrative    Not on file       Family History   Problem Relation Age of Onset    Stroke Mother 52         brain aneurysm     Stroke Brother 52    Alzheimer's Disease Maternal Grandmother     Atrial Fibrillation Father        REVIEW OF SYSTEMS:     CONSTITUTIONAL:  negative for  fevers, chills, sweats and fatigue  HEENT:  negative for  tinnitus, earaches, nasal congestion and epistaxis  RESPIRATORY:  negative for  dry cough, cough with sputum, dyspnea, wheezing and hemoptysis  GASTROINTESTINAL:  negative for nausea, vomiting, diarrhea, constipation, pruritus and jaundice  HEMATOLOGIC/LYMPHATIC:  negative for easy bruising, bleeding, lymphadenopathy and petechiae  ENDOCRINE:  negative for heat intolerance, cold intolerance, tremor, hair loss and diabetic symptoms including neither polyuria nor polydipsia nor blurred vision  MUSCULOSKELETAL:  negative for  myalgias, arthralgias, joint swelling, stiff joints and decreased range of motion  NEUROLOGICAL:  negative for memory problems, speech problems, visual disturbance, dysphagia, weakness and numbness      PHYSICAL EXAM:   Not obtained due to telephone visit      There were no vitals taken for this visit. DATA:   I personally reviewed the visit EKG with the following interpretation:    ECHO: 1/20/20 Summary   No previous echo for comparison. Technically adequate study.   Mild asymmetric septal hypertrophy.   Ejection fraction is visually estimated at 40-45%.   Mild anteroseptal wall hypokinesis   E/A flow reversal noted. Suggestive of diastolic dysfunction.   Interatrial septum appears intact.   Agitated saline injected for shunt evaluation.   Mild mitral regurgitation is present. Stress Test: Stress Test: 1/22/20   FINDINGS:   Perfusion images demonstrate no reversible perfusion defect. Wall motion is hypokinetic   The end diastolic volume is 061 ml. The end systolic volume is 477 ml. The estimated ejection fraction is 25 %.         Impression   1. No reversible perfusion defect   2. Ejection fraction is 25 %.    3. Wall motion appears to be global hypokinesia             Cardiology Labs: BMP:    Lab Results   Component Value Date     01/21/2020    K 4.0 01/21/2020     01/21/2020    CO2 21 01/21/2020    BUN 13 01/21/2020    CREATININE 0.8 01/21/2020     CMP:    Lab Results   Component Value Date     01/21/2020    K 4.0 01/21/2020     01/21/2020    CO2 21 01/21/2020    BUN 13 01/21/2020    CREATININE 0.8 01/21/2020    PROT 6.7 01/20/2020     CBC:    Lab Results   Component Value Date    WBC 6.2 01/21/2020    RBC 5.38 01/21/2020    HGB 15.6 01/21/2020    HCT 49.3 01/21/2020    MCV 91.6 01/21/2020    RDW 12.6 01/21/2020  01/21/2020     PT/INR:  No results found for: PTINR  PT/INR Warfarin:  No components found for: PTPATWAR, PTINRWAR  PTT:  No results found for: APTT  PTT Heparin:  No components found for: APTTHEP  Magnesium:    Lab Results   Component Value Date    MG 2.0 01/21/2020     TSH:    Lab Results   Component Value Date    TSH 0.590 01/21/2020     TROPONIN:  No components found for: TROP  BNP:  No results found for: BNP  FASTING LIPID PANEL:    Lab Results   Component Value Date    HDL 49 01/20/2020     No orders to display     I have personally reviewed the laboratory, cardiac diagnostic and radiographic testing as outlined above:      IMPRESSION:  1.  Cardiomyopathy: Nonischemic, recent negative Lexiscan stress test   2.  Hypertension: Patient was advised to check his blood pressure twice daily, call with the blood pressure readings next week  3.  abnormal EKG: As above  4.  Mild mitral valve regurgitation  5.  Headache and aphasia: No recurrence since hospital admission  RECOMMENDATIONS:   1. Continue current treatment  2. Patient was advised to check his blood pressure twice daily, call with the blood pressure readings next week  3. Get established with a PCP  4. Follow-up with Dr. Gordon Jung in 1 year, sooner if symptomatic for any reason    I have reviewed my findings and recommendations with patient    Geovanni Romero is a 49 y.o. male evaluated via telephone on 6/2/2020. Consent:  He and/or health care decision maker is aware that that he may receive a bill for this telephone service, depending on his insurance coverage, and has provided verbal consent to proceed: Yes      Documentation:  I communicated with the patient and/or health care decision maker about htn. Details of this discussion including any medical advice provided.       I affirm this is a Patient Initiated Episode with a Patient who has not had a related appointment within my department in the past 7 days or scheduled within the next 24

## 2021-06-22 ENCOUNTER — APPOINTMENT (OUTPATIENT)
Dept: GENERAL RADIOLOGY | Age: 56
End: 2021-06-22
Payer: COMMERCIAL

## 2021-06-22 ENCOUNTER — APPOINTMENT (OUTPATIENT)
Dept: CT IMAGING | Age: 56
End: 2021-06-22
Payer: COMMERCIAL

## 2021-06-22 ENCOUNTER — HOSPITAL ENCOUNTER (EMERGENCY)
Age: 56
Discharge: HOME | End: 2021-06-22
Attending: EMERGENCY MEDICINE
Payer: COMMERCIAL

## 2021-06-22 VITALS
DIASTOLIC BLOOD PRESSURE: 89 MMHG | HEART RATE: 102 BPM | OXYGEN SATURATION: 96 % | SYSTOLIC BLOOD PRESSURE: 146 MMHG | RESPIRATION RATE: 16 BRPM | TEMPERATURE: 98.9 F

## 2021-06-22 DIAGNOSIS — S52.502A CLOSED FRACTURE OF DISTAL END OF LEFT RADIUS, UNSPECIFIED FRACTURE MORPHOLOGY, INITIAL ENCOUNTER: ICD-10-CM

## 2021-06-22 DIAGNOSIS — R10.9 ABDOMINAL PAIN, UNSPECIFIED ABDOMINAL LOCATION: ICD-10-CM

## 2021-06-22 DIAGNOSIS — R91.1 PULMONARY NODULE: ICD-10-CM

## 2021-06-22 DIAGNOSIS — S16.1XXA STRAIN OF NECK MUSCLE, INITIAL ENCOUNTER: ICD-10-CM
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
## 2021-06-22 LAB
ALBUMIN SERPL-MCNC: 4.1 G/DL (ref 3.5–5.2)
ALP BLD-CCNC: 101 U/L (ref 40–129)
ALT SERPL-CCNC: 28 U/L (ref 0–40)
ANION GAP SERPL CALCULATED.3IONS-SCNC: 11 MMOL/L (ref 7–16)
AST SERPL-CCNC: 32 U/L (ref 0–39)
BASOPHILS ABSOLUTE: 0.03 E9/L (ref 0–0.2)
BASOPHILS RELATIVE PERCENT: 0.4 % (ref 0–2)
BILIRUB SERPL-MCNC: 0.9 MG/DL (ref 0–1.2)
BUN BLDV-MCNC: 16 MG/DL (ref 6–20)
CALCIUM SERPL-MCNC: 8.7 MG/DL (ref 8.6–10.2)
CHLORIDE BLD-SCNC: 106 MMOL/L (ref 98–107)
CO2: 24 MMOL/L (ref 22–29)
CREAT SERPL-MCNC: 0.7 MG/DL (ref 0.7–1.2)
EOSINOPHILS ABSOLUTE: 0.15 E9/L (ref 0.05–0.5)
EOSINOPHILS RELATIVE PERCENT: 1.8 % (ref 0–6)
GFR AFRICAN AMERICAN: >60
GFR NON-AFRICAN AMERICAN: >60 ML/MIN/1.73
GLUCOSE BLD-MCNC: 110 MG/DL (ref 74–99)
HCT VFR BLD CALC: 46.9 % (ref 37–54)
HEMOGLOBIN: 15.1 G/DL (ref 12.5–16.5)
IMMATURE GRANULOCYTES #: 0.11 E9/L
IMMATURE GRANULOCYTES %: 1.3 % (ref 0–5)
LIPASE: 53 U/L (ref 13–60)
LYMPHOCYTES ABSOLUTE: 1.75 E9/L (ref 1.5–4)
LYMPHOCYTES RELATIVE PERCENT: 20.9 % (ref 20–42)
MCH RBC QN AUTO: 28.8 PG (ref 26–35)
MCHC RBC AUTO-ENTMCNC: 32.2 % (ref 32–34.5)
MCV RBC AUTO: 89.3 FL (ref 80–99.9)
MONOCYTES ABSOLUTE: 0.74 E9/L (ref 0.1–0.95)
MONOCYTES RELATIVE PERCENT: 8.9 % (ref 2–12)
NEUTROPHILS ABSOLUTE: 5.58 E9/L (ref 1.8–7.3)
NEUTROPHILS RELATIVE PERCENT: 66.7 % (ref 43–80)
PDW BLD-RTO: 14 FL (ref 11.5–15)
PLATELET # BLD: 240 E9/L (ref 130–450)
PMV BLD AUTO: 10.3 FL (ref 7–12)
POTASSIUM REFLEX MAGNESIUM: 4 MMOL/L (ref 3.5–5)
RBC # BLD: 5.25 E12/L (ref 3.8–5.8)
SODIUM BLD-SCNC: 141 MMOL/L (ref 132–146)
TOTAL PROTEIN: 6.9 G/DL (ref 6.4–8.3)
WBC # BLD: 8.4 E9/L (ref 4.5–11.5)

## 2021-06-22 PROCEDURE — 74177 CT ABD & PELVIS W/CONTRAST: CPT

## 2021-06-22 PROCEDURE — 29125 APPL SHORT ARM SPLINT STATIC: CPT

## 2021-06-22 PROCEDURE — 73090 X-RAY EXAM OF FOREARM: CPT

## 2021-06-22 PROCEDURE — 80053 COMPREHEN METABOLIC PANEL: CPT

## 2021-06-22 PROCEDURE — 6360000004 HC RX CONTRAST MEDICATION: Performed by: NURSE PRACTITIONER

## 2021-06-22 PROCEDURE — 71250 CT THORAX DX C-: CPT

## 2021-06-22 PROCEDURE — 6370000000 HC RX 637 (ALT 250 FOR IP): Performed by: NURSE PRACTITIONER

## 2021-06-22 PROCEDURE — 72125 CT NECK SPINE W/O DYE: CPT

## 2021-06-22 PROCEDURE — 85025 COMPLETE CBC W/AUTO DIFF WBC: CPT

## 2021-06-22 PROCEDURE — 73110 X-RAY EXAM OF WRIST: CPT

## 2021-06-22 PROCEDURE — 70450 CT HEAD/BRAIN W/O DYE: CPT

## 2021-06-22 PROCEDURE — 83690 ASSAY OF LIPASE: CPT

## 2021-06-22 PROCEDURE — 2580000003 HC RX 258: Performed by: NURSE PRACTITIONER

## 2021-06-22 PROCEDURE — 99285 EMERGENCY DEPT VISIT HI MDM: CPT

## 2021-06-22 RX ORDER — HYDROCODONE BITARTRATE AND ACETAMINOPHEN 5; 325 MG/1; MG/1
1 TABLET ORAL EVERY 6 HOURS PRN
Qty: 10 TABLET | Refills: 0 | Status: SHIPPED | OUTPATIENT
Start: 2021-06-22 | End: 2021-06-25

## 2021-06-22 RX ORDER — IBUPROFEN 800 MG/1
800 TABLET ORAL EVERY 8 HOURS PRN
Qty: 21 TABLET | Refills: 0 | Status: SHIPPED | OUTPATIENT
Start: 2021-06-22 | End: 2021-07-07

## 2021-06-22 RX ORDER — 0.9 % SODIUM CHLORIDE 0.9 %
1000 INTRAVENOUS SOLUTION INTRAVENOUS ONCE
Status: COMPLETED | OUTPATIENT
Start: 2021-06-22 | End: 2021-06-22

## 2021-06-22 RX ORDER — OXYCODONE HYDROCHLORIDE AND ACETAMINOPHEN 5; 325 MG/1; MG/1
1 TABLET ORAL ONCE
Status: COMPLETED | OUTPATIENT
Start: 2021-06-22 | End: 2021-06-22

## 2021-06-22 RX ADMIN — IOPAMIDOL 90 ML: 755 INJECTION, SOLUTION INTRAVENOUS at 15:31

## 2021-06-22 RX ADMIN — OXYCODONE HYDROCHLORIDE AND ACETAMINOPHEN 1 TABLET: 5; 325 TABLET ORAL at 17:41

## 2021-06-22 RX ADMIN — SODIUM CHLORIDE 1000 ML: 9 INJECTION, SOLUTION INTRAVENOUS at 14:13

## 2021-06-22 ASSESSMENT — PAIN DESCRIPTION - ORIENTATION: ORIENTATION: LEFT

## 2021-06-22 ASSESSMENT — PAIN SCALES - GENERAL
PAINLEVEL_OUTOF10: 7
PAINLEVEL_OUTOF10: 7

## 2021-06-22 ASSESSMENT — PAIN DESCRIPTION - PAIN TYPE: TYPE: ACUTE PAIN

## 2021-06-22 ASSESSMENT — PAIN DESCRIPTION - LOCATION: LOCATION: WRIST
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
## 2021-07-04 ENCOUNTER — APPOINTMENT (OUTPATIENT)
Dept: GENERAL RADIOLOGY | Age: 56
End: 2021-07-04
Payer: COMMERCIAL

## 2021-07-04 ENCOUNTER — HOSPITAL ENCOUNTER (EMERGENCY)
Age: 56
Discharge: HOME | End: 2021-07-04
Attending: EMERGENCY MEDICINE
Payer: COMMERCIAL

## 2021-07-04 VITALS
SYSTOLIC BLOOD PRESSURE: 158 MMHG | DIASTOLIC BLOOD PRESSURE: 103 MMHG | WEIGHT: 230 LBS | HEART RATE: 100 BPM | BODY MASS INDEX: 33 KG/M2 | OXYGEN SATURATION: 94 % | RESPIRATION RATE: 16 BRPM | TEMPERATURE: 97 F

## 2021-07-04 DIAGNOSIS — S52.502A CLOSED FRACTURE OF DISTAL END OF LEFT RADIUS, UNSPECIFIED FRACTURE MORPHOLOGY, INITIAL ENCOUNTER: ICD-10-CM

## 2021-07-04 DIAGNOSIS — Z46.89 ENCOUNTER FOR CAST CHECK: Primary | ICD-10-CM

## 2021-07-04 PROCEDURE — 73110 X-RAY EXAM OF WRIST: CPT

## 2021-07-04 PROCEDURE — 99283 EMERGENCY DEPT VISIT LOW MDM: CPT

## 2021-07-04 PROCEDURE — 29125 APPL SHORT ARM SPLINT STATIC: CPT

## 2021-07-04 PROCEDURE — 73070 X-RAY EXAM OF ELBOW: CPT

## 2021-07-04 PROCEDURE — 73130 X-RAY EXAM OF HAND: CPT

## 2021-07-04 RX ORDER — HYDROCODONE BITARTRATE AND ACETAMINOPHEN 5; 325 MG/1; MG/1
1 TABLET ORAL EVERY 6 HOURS PRN
COMMUNITY

## 2021-07-04 RX ORDER — FAMOTIDINE 20 MG/1
20 TABLET, FILM COATED ORAL DAILY
COMMUNITY

## 2021-07-04 ASSESSMENT — PAIN DESCRIPTION - FREQUENCY: FREQUENCY: CONTINUOUS

## 2021-07-04 ASSESSMENT — PAIN DESCRIPTION - PAIN TYPE: TYPE: ACUTE PAIN

## 2021-07-04 ASSESSMENT — PAIN DESCRIPTION - ORIENTATION: ORIENTATION: LEFT

## 2021-07-04 ASSESSMENT — PAIN DESCRIPTION - DESCRIPTORS: DESCRIPTORS: SHARP;PRESSURE

## 2021-07-04 ASSESSMENT — PAIN SCALES - GENERAL: PAINLEVEL_OUTOF10: 6

## 2021-07-04 ASSESSMENT — PAIN DESCRIPTION - ONSET: ONSET: ON-GOING

## 2021-07-04 ASSESSMENT — PAIN DESCRIPTION - LOCATION: LOCATION: WRIST
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
## 2021-07-07 ENCOUNTER — OFFICE VISIT (OUTPATIENT)
Dept: ORTHOPEDIC SURGERY | Age: 56
End: 2021-07-07
Payer: COMMERCIAL

## 2021-07-07 VITALS — BODY MASS INDEX: 32.93 KG/M2 | WEIGHT: 230 LBS | HEIGHT: 70 IN

## 2021-07-07 DIAGNOSIS — S52.502A CLOSED FRACTURE OF DISTAL END OF LEFT RADIUS, UNSPECIFIED FRACTURE MORPHOLOGY, INITIAL ENCOUNTER: ICD-10-CM

## 2021-07-07 DIAGNOSIS — S62.102D CLOSED FRACTURE OF LEFT WRIST WITH ROUTINE HEALING, SUBSEQUENT ENCOUNTER: Primary | ICD-10-CM

## 2021-07-07 PROCEDURE — G8417 CALC BMI ABV UP PARAM F/U: HCPCS | Performed by: ORTHOPAEDIC SURGERY

## 2021-07-07 PROCEDURE — A4590 SPECIAL CASTING MATERIAL: HCPCS | Performed by: ORTHOPAEDIC SURGERY

## 2021-07-07 PROCEDURE — 29075 APPL CST ELBW FNGR SHORT ARM: CPT | Performed by: ORTHOPAEDIC SURGERY

## 2021-07-07 PROCEDURE — 3017F COLORECTAL CA SCREEN DOC REV: CPT | Performed by: ORTHOPAEDIC SURGERY

## 2021-07-07 PROCEDURE — G8427 DOCREV CUR MEDS BY ELIG CLIN: HCPCS | Performed by: ORTHOPAEDIC SURGERY

## 2021-07-07 PROCEDURE — 99203 OFFICE O/P NEW LOW 30 MIN: CPT | Performed by: ORTHOPAEDIC SURGERY

## 2021-07-07 PROCEDURE — 1036F TOBACCO NON-USER: CPT | Performed by: ORTHOPAEDIC SURGERY

## 2021-07-07 RX ORDER — ACETAMINOPHEN 325 MG/1
650 TABLET ORAL EVERY 8 HOURS PRN
COMMUNITY

## 2021-07-07 RX ORDER — TRAMADOL HYDROCHLORIDE 50 MG/1
50 TABLET ORAL EVERY 8 HOURS PRN
Qty: 21 TABLET | Refills: 0 | Status: SHIPPED | OUTPATIENT
Start: 2021-07-07 | End: 2021-07-14

## 2021-07-07 NOTE — PROGRESS NOTES
A fiberglass short arm cast was applied to His Left arm. Neurovascular status was checked pre and post application. Patient was neurovascularly intact after the application process. The patient denied any issues with fit or comfort of the cast/splint. advised to avoid activities that put them at risk for falling. Patient instructed to call our office if there are any issues with the cast/splint.

## 2021-07-07 NOTE — PROGRESS NOTES
seen today for wrist injury. Diagnoses and all orders for this visit:    Closed fracture of left wrist with routine healing, subsequent encounter  -     XR WRIST LEFT (2 VIEWS)  -     traMADol (ULTRAM) 50 MG tablet; Take 1 tablet by mouth every 8 hours as needed for Pain for up to 7 days.  -     SC APPLY FOREARM CAST  -     SC SPECIAL CASTING MATERIAL    Closed fracture of distal end of left radius, unspecified fracture morphology, initial encounter       Diagnosis and treatment alternatives were reviewed with the patient, overall his alignment appears satisfactory and his motion is actually fairly good alternatives of open reduction internal fixation versus closed treatment with close observation were discussed, benefits of surgery primarily being cosmetics not particularly related to improved outcomes functionally, patient prefers to avoid surgery if possible. He was placed in a well molded short arm cast in neutral position and he will follow up here in 2 weeks for cast check and AP lateral x-ray exams of the left wrist.  He is given prescription for Ultram as needed for pain be he will work with over-the-counter's as much as possible. Return in about 2 weeks (around 7/21/2021) for cast check w XRAY.        Abimael Rliey MD    7/7/2021  1:45 PM      Patient Active Problem List   Diagnosis    Slurred speech    Hypertension    Headache    TIA (transient ischemic attack)    Abnormal EKG    Vegetarian    Hypertensive encephalopathy    Cardiomyopathy ([address]    Nonrheumatic mitral valve regurgitation       Past Medical History:   Diagnosis Date    GERD (gastroesophageal reflux disease)     HTN (hypertension)     Vegetarian diet        Past Surgical History:   Procedure Laterality Date    TONSILLECTOMY         Current Outpatient Medications   Medication Sig Dispense Refill    acetaminophen (TYLENOL) 325 MG tablet Take 650 mg by mouth every 8 hours as needed for Pain      traMADol (ULTRAM) 50 MG tablet Take 1 tablet by mouth every 8 hours as needed for Pain for up to 7 days. 21 tablet 0    famotidine (PEPCID) 20 MG tablet Take 20 mg by mouth daily      aspirin 81 MG EC tablet Take 1 tablet by mouth daily 180 tablet 1    metoprolol succinate (TOPROL XL) 25 MG extended release tablet Take 0.5 tablets by mouth daily 90 tablet 3    omeprazole (PRILOSEC) 20 MG delayed release capsule Take 1 capsule by mouth every morning (before breakfast) 90 capsule 1    HYDROcodone-acetaminophen (NORCO) 5-325 MG per tablet Take 1 tablet by mouth every 6 hours as needed for Pain. (Patient not taking: Reported on 2021)      escitalopram (LEXAPRO) 5 MG tablet Take 5 mg by mouth daily (Patient not taking: Reported on 2021)      enalapril (VASOTEC) 10 MG tablet Take 1 tablet by mouth 2 times daily (Patient not taking: Reported on 2021) 180 tablet 2    cyanocobalamin ([employer] VITAMIN B12) 1000 MCG tablet Take 1 tablet by mouth daily (Patient not taking: Reported on 2021) 30 tablet 0     No current facility-administered medications for this visit. No Known Allergies    Social History     Socioeconomic History    Marital status: Unknown     Spouse name: None    Number of children: None    Years of education: None    Highest education level: None   Occupational History    Occupation: laid off   Tobacco Use    Smoking status: Former Smoker     Types: Cigarettes     Quit date:      Years since quittin.5    Smokeless tobacco: Never Used   Vaping Use    Vaping Use: Never used   Substance and Sexual Activity    Alcohol use: Not Currently     Comment: rarely.      Drug use: Never    Sexual activity: None   Other Topics Concern    None   Social History Narrative    None     Social Determinants of Health     Financial Resource Strain:     Difficulty of Paying Living Expenses:    Food Insecurity:     Worried About Running Out of Food in the Last Year:     [address] in the Last Year:

## 2021-07-20 ENCOUNTER — OFFICE VISIT (OUTPATIENT)
Dept: ORTHOPEDIC SURGERY | Age: 56
End: 2021-07-20
Payer: COMMERCIAL

## 2021-07-20 VITALS — HEIGHT: 70 IN | BODY MASS INDEX: 32.93 KG/M2 | WEIGHT: 230 LBS

## 2021-07-20 DIAGNOSIS — S62.102D CLOSED FRACTURE OF LEFT WRIST WITH ROUTINE HEALING, SUBSEQUENT ENCOUNTER: Primary | ICD-10-CM

## 2021-07-20 PROCEDURE — 1036F TOBACCO NON-USER: CPT | Performed by: ORTHOPAEDIC SURGERY

## 2021-07-20 PROCEDURE — G8417 CALC BMI ABV UP PARAM F/U: HCPCS | Performed by: ORTHOPAEDIC SURGERY

## 2021-07-20 PROCEDURE — G8427 DOCREV CUR MEDS BY ELIG CLIN: HCPCS | Performed by: ORTHOPAEDIC SURGERY

## 2021-07-20 PROCEDURE — 3017F COLORECTAL CA SCREEN DOC REV: CPT | Performed by: ORTHOPAEDIC SURGERY

## 2021-07-20 PROCEDURE — 99213 OFFICE O/P EST LOW 20 MIN: CPT | Performed by: ORTHOPAEDIC SURGERY

## 2021-07-20 NOTE — PROGRESS NOTES
Cast removed    A wrist brace was applied to His Left wrist(s). Use and care of the brace was reviewed with patient The patient denied any issues with fit or comfort of the braces  Patient instructed to call our office if there are any issues with the braces.     Brace supplied by and billed for by [facility]

## 2021-07-21 NOTE — PROGRESS NOTES
motion to tolerance. Questions asked and answered follow-up in 1 month for clinical and x-ray exams AP lateral left wrist.    Return in about 1 month (around 8/20/2021) for xray. Current Outpatient Medications   Medication Sig Dispense Refill    acetaminophen (TYLENOL) 325 MG tablet Take 650 mg by mouth every 8 hours as needed for Pain      famotidine (PEPCID) 20 MG tablet Take 20 mg by mouth daily      aspirin 81 MG EC tablet Take 1 tablet by mouth daily 180 tablet 1    metoprolol succinate (TOPROL XL) 25 MG extended release tablet Take 0.5 tablets by mouth daily (Patient taking differently: Take 25 mg by mouth daily ) 90 tablet 3    HYDROcodone-acetaminophen (NORCO) 5-325 MG per tablet Take 1 tablet by mouth every 6 hours as needed for Pain. (Patient not taking: Reported on 7/7/2021)      escitalopram (LEXAPRO) 5 MG tablet Take 5 mg by mouth daily (Patient not taking: Reported on 7/7/2021)      enalapril (VASOTEC) 10 MG tablet Take 1 tablet by mouth 2 times daily (Patient not taking: Reported on 7/7/2021) 180 tablet 2    omeprazole (PRILOSEC) 20 MG delayed release capsule Take 1 capsule by mouth every morning (before breakfast) (Patient not taking: Reported on 7/20/2021) 90 capsule 1    cyanocobalamin ([employer] VITAMIN B12) 1000 MCG tablet Take 1 tablet by mouth daily (Patient not taking: Reported on 7/7/2021) 30 tablet 0     No current facility-administered medications for this visit.        Patient Active Problem List   Diagnosis    Slurred speech    Hypertension    Headache    TIA (transient ischemic attack)    Abnormal EKG    Vegetarian    Hypertensive encephalopathy    Cardiomyopathy ([facility] [address]    Nonrheumatic mitral valve regurgitation       Past Medical History:   Diagnosis Date    GERD (gastroesophageal reflux disease)     HTN (hypertension)     Vegetarian diet        Past Surgical History:   Procedure Laterality Date    TONSILLECTOMY         No Known Allergies    Social History Socioeconomic History    Marital status: Unknown     Spouse name: None    Number of children: None    Years of education: None    Highest education level: None   Occupational History    Occupation: laid off   Tobacco Use    Smoking status: Former Smoker     Types: Cigarettes     Quit date:      Years since quittin.5    Smokeless tobacco: Never Used   Vaping Use    Vaping Use: Never used   Substance and Sexual Activity    Alcohol use: Not Currently     Comment: rarely.  Drug use: Never    Sexual activity: None   Other Topics Concern    None   Social History Narrative    None     Social Determinants of Health     Financial Resource Strain:     Difficulty of Paying Living Expenses:    Food Insecurity:     Worried About Running Out of Food in the Last Year:     [address] in the Last Year:    Transportation Needs:     Lack of Transportation (Medical):  Lack of Transportation (Non-Medical):    Physical Activity:     Days of Exercise per Week:     Minutes of Exercise per Session:    Stress:     Feeling of Stress :    Social Connections:     Frequency of Communication with Friends and Family:     Frequency of Social Gatherings with Friends and Family:     Attends L[employer] day Services:     Active Member of Clubs or Organizations:     Attends Club or Organization Meetings:     Marital Status:    Intimate Partner Violence:     Fear of Current or Ex-Partner:     Emotionally Abused:     Physically Abused:     Sexually Abused:        Family History   Problem Relation Age of Onset    Stroke Mother 52         brain aneurysm     Stroke Brother 52    Alzheimer's Disease Maternal Grandmother     Atrial Fibrillation Father          Review of Systems  As follows except as previously noted in HPI:  Constitutional: Negative for chills, diaphoresis, fatigue, fever and unexpected weight change. Respiratory: Negative for cough, shortness of breath and wheezing.     Cardiovascular: Negative for chest pain and palpitations. Neurological: Negative for dizziness, syncope, cephalgia. GI / : negative  Musculoskeletal: see HPI       Objective:   Physical Exam   Constitutional: Oriented to person, place, and time. and appears well-developed and well-nourished. :   Head: Normocephalic and atraumatic. Eyes: EOM are normal.   Neck: Neck supple. Cardiovascular: Normal rate and regular rhythm. Pulmonary/Chest: Effort normal. No stridor. No respiratory distress, no wheezes. Abdominal:  No abnormal distension. Neurological: Alert and oriented to person, place, and time. Skin: Skin is warm and dry. Psychiatric: Normal mood and affect.  Behavior is normal. Thought content normal.    7/20/2021  9:06 PM

## 2021-08-24 ENCOUNTER — OFFICE VISIT (OUTPATIENT)
Dept: ORTHOPEDIC SURGERY | Age: 56
End: 2021-08-24
Payer: COMMERCIAL

## 2021-08-24 VITALS — WEIGHT: 253 LBS | BODY MASS INDEX: 36.22 KG/M2 | HEIGHT: 70 IN

## 2021-08-24 DIAGNOSIS — S52.532P CLOSED COLLES' FRACTURE OF LEFT RADIUS WITH MALUNION, SUBSEQUENT ENCOUNTER: Primary | ICD-10-CM

## 2021-08-24 PROCEDURE — G8427 DOCREV CUR MEDS BY ELIG CLIN: HCPCS | Performed by: ORTHOPAEDIC SURGERY

## 2021-08-24 PROCEDURE — G8417 CALC BMI ABV UP PARAM F/U: HCPCS | Performed by: ORTHOPAEDIC SURGERY

## 2021-08-24 PROCEDURE — 1036F TOBACCO NON-USER: CPT | Performed by: ORTHOPAEDIC SURGERY

## 2021-08-24 PROCEDURE — 99213 OFFICE O/P EST LOW 20 MIN: CPT | Performed by: ORTHOPAEDIC SURGERY

## 2021-08-24 PROCEDURE — 3017F COLORECTAL CA SCREEN DOC REV: CPT | Performed by: ORTHOPAEDIC SURGERY

## 2021-08-24 RX ORDER — AMLODIPINE BESYLATE 5 MG/1
10 TABLET ORAL DAILY
COMMUNITY
Start: 2021-08-06

## 2021-09-01 ENCOUNTER — TELEPHONE (OUTPATIENT)
Dept: ORTHOPEDIC SURGERY | Age: 56
End: 2021-09-01

## 2021-09-01 DIAGNOSIS — S52.532P CLOSED COLLES' FRACTURE OF LEFT RADIUS WITH MALUNION, SUBSEQUENT ENCOUNTER: Primary | ICD-10-CM

## 2021-09-02 ENCOUNTER — OFFICE VISIT (OUTPATIENT)
Dept: ORTHOPEDIC SURGERY | Age: 56
End: 2021-09-02
Payer: COMMERCIAL

## 2021-09-02 VITALS — RESPIRATION RATE: 20 BRPM | HEIGHT: 70 IN | BODY MASS INDEX: 35.5 KG/M2 | WEIGHT: 248 LBS

## 2021-09-02 DIAGNOSIS — S52.532P CLOSED COLLES' FRACTURE OF LEFT RADIUS WITH MALUNION, SUBSEQUENT ENCOUNTER: Primary | ICD-10-CM

## 2021-09-02 PROCEDURE — 1036F TOBACCO NON-USER: CPT | Performed by: ORTHOPAEDIC SURGERY

## 2021-09-02 PROCEDURE — 99204 OFFICE O/P NEW MOD 45 MIN: CPT | Performed by: ORTHOPAEDIC SURGERY

## 2021-09-02 PROCEDURE — 3017F COLORECTAL CA SCREEN DOC REV: CPT | Performed by: ORTHOPAEDIC SURGERY

## 2021-09-02 PROCEDURE — G8417 CALC BMI ABV UP PARAM F/U: HCPCS | Performed by: ORTHOPAEDIC SURGERY

## 2021-09-02 PROCEDURE — G8427 DOCREV CUR MEDS BY ELIG CLIN: HCPCS | Performed by: ORTHOPAEDIC SURGERY

## 2021-09-02 RX ORDER — BUPROPION HYDROCHLORIDE 150 MG/1
300 TABLET ORAL DAILY
COMMUNITY
Start: 2021-08-31
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
## 2021-10-03 ENCOUNTER — HOSPITAL ENCOUNTER (OUTPATIENT)
Dept: CT IMAGING | Age: 56
Discharge: HOME | End: 2021-10-05
Payer: COMMERCIAL

## 2021-10-03 DIAGNOSIS — S52.532P CLOSED COLLES' FRACTURE OF LEFT RADIUS WITH MALUNION, SUBSEQUENT ENCOUNTER: ICD-10-CM

## 2021-10-03 PROCEDURE — 76376 3D RENDER W/INTRP POSTPROCES: CPT

## 2021-10-03 PROCEDURE — 73200 CT UPPER EXTREMITY W/O DYE: CPT

## 2021-10-07 ENCOUNTER — OFFICE VISIT (OUTPATIENT)
Dept: ORTHOPEDIC SURGERY | Age: 56
End: 2021-10-07
Payer: COMMERCIAL

## 2021-10-07 VITALS — RESPIRATION RATE: 18 BRPM | HEIGHT: 71 IN | BODY MASS INDEX: 35 KG/M2 | WEIGHT: 250 LBS

## 2021-10-07 DIAGNOSIS — S52.572P OTHER CLOSED INTRA-ARTICULAR FRACTURE OF DISTAL END OF LEFT RADIUS WITH MALUNION, SUBSEQUENT ENCOUNTER: Primary | ICD-10-CM

## 2021-10-07 PROCEDURE — G8417 CALC BMI ABV UP PARAM F/U: HCPCS | Performed by: ORTHOPAEDIC SURGERY

## 2021-10-07 PROCEDURE — G8427 DOCREV CUR MEDS BY ELIG CLIN: HCPCS | Performed by: ORTHOPAEDIC SURGERY

## 2021-10-07 PROCEDURE — 99214 OFFICE O/P EST MOD 30 MIN: CPT | Performed by: ORTHOPAEDIC SURGERY

## 2021-10-07 PROCEDURE — 3017F COLORECTAL CA SCREEN DOC REV: CPT | Performed by: ORTHOPAEDIC SURGERY

## 2021-10-07 PROCEDURE — 1036F TOBACCO NON-USER: CPT | Performed by: ORTHOPAEDIC SURGERY

## 2021-10-07 PROCEDURE — G8484 FLU IMMUNIZE NO ADMIN: HCPCS | Performed by: ORTHOPAEDIC SURGERY

## 2021-10-07 RX ORDER — LANOLIN ALCOHOL/MO/W.PET/CERES
3 CREAM (GRAM) TOPICAL NIGHTLY PRN
COMMUNITY
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
## 2022-07-15 ENCOUNTER — HOSPITAL ENCOUNTER (OUTPATIENT)
Age: 57
Discharge: HOME | End: 2022-07-15
Payer: COMMERCIAL

## 2022-07-15 LAB — PROLACTIN: 9.55 NG/ML

## 2022-07-15 PROCEDURE — 84403 ASSAY OF TOTAL TESTOSTERONE: CPT

## 2022-07-15 PROCEDURE — 84270 ASSAY OF SEX HORMONE GLOBUL: CPT

## 2022-07-15 PROCEDURE — 84146 ASSAY OF PROLACTIN: CPT

## 2022-07-15 PROCEDURE — 36415 COLL VENOUS BLD VENIPUNCTURE: CPT

## 2022-07-16 LAB
SEX HORMONE BINDING GLOBULIN: 45 NMOL/L (ref 11–80)
TESTOSTERONE FREE-NONMALE: 119.7 PG/ML (ref 47–244)
TESTOSTERONE TOTAL: 661 NG/DL (ref 220–1000)

## 2022-08-12 ENCOUNTER — TELEPHONE (OUTPATIENT)
Dept: ORTHOPEDIC SURGERY | Age: 57
End: 2022-08-12

## 2022-08-12 NOTE — TELEPHONE ENCOUNTER
8/12/2022 Late Entry    8/11/2022 8:50 am Patient phoned the office / Message left on voice mail: I saw Dr. Angella Casillas last year for a wrist fracture. Then Dr. Baylee Bass saw me in the Fall and said it was OK to work. My  is requesting a note from that date. 8/12/2022 8:15 am After review of office notes in epic / Informed Michi TRAYLOR, Dr. Magan Bellamy nurse of patient's request. Michael Hernandez will review notes and then consult w/ Dr. Baylee Bass.

## 2022-08-19 ENCOUNTER — TELEPHONE (OUTPATIENT)
Dept: ORTHOPEDIC SURGERY | Age: 57
End: 2022-08-19

## 2022-08-19 NOTE — LETTER
[address] [facility].  [address] [address]  Phone: [phone]  Fax: [phone]    South Brewer MD        August 19, 2022     Patient: Malissa Solis   YOB: 1965   Date of Visit: 10/7/2021       To Whom It May Concern: It is my medical opinion that Malissa Solis was unable to perform his regular job duties beginning on 06/22/2021 through 10/07/2021. If you have any questions or concerns, please don't hesitate to call.     Sincerely,        South Brewer MD

## 2022-08-19 NOTE — LETTER
[address] [facility].  [address] [address]  Phone: [phone]  Fax: [phone]    Kwabena Santos MD        August 19, 2022     Patient: Yuriy Al   YOB: 1965   Date of Visit: 8/19/2022       To Whom It May Concern: It is my medical opinion that Yuriy Al {participation release, (activity restriction):63703}. If you have any questions or concerns, please don't hesitate to call.     Sincerely,        Kwabena Santos MD

## 2022-08-19 NOTE — Clinical Note
[address] [facility].  [address] [address]  Phone: [phone]  Fax: [phone]    Wilmer Warren MD        August 19, 2022     Patient: Juany Hernadez   YOB: 1965   Date of Visit: 8/19/2022       To Whom It May Concern: It is my medical opinion that Juany Hernadez {Work release (duty restriction):74393}. If you have any questions or concerns, please don't hesitate to call.     Sincerely,        Wilmer Warren MD

## 2024-06-22 ENCOUNTER — HOSPITAL ENCOUNTER (EMERGENCY)
Age: 59
Discharge: HOME | End: 2024-06-22
Attending: EMERGENCY MEDICINE
Payer: OTHER GOVERNMENT

## 2024-06-22 ENCOUNTER — APPOINTMENT (OUTPATIENT)
Dept: GENERAL RADIOLOGY | Age: 59
End: 2024-06-22
Payer: OTHER GOVERNMENT

## 2024-06-22 VITALS
HEIGHT: 70 IN | TEMPERATURE: 97.6 F | SYSTOLIC BLOOD PRESSURE: 145 MMHG | OXYGEN SATURATION: 98 % | DIASTOLIC BLOOD PRESSURE: 94 MMHG | WEIGHT: 230 LBS | RESPIRATION RATE: 18 BRPM | BODY MASS INDEX: 32.93 KG/M2 | HEART RATE: 79 BPM

## 2024-06-22 DIAGNOSIS — T14.8XXA PUNCTURE WOUND: Primary | ICD-10-CM

## 2024-06-22 PROCEDURE — 6370000000 HC RX 637 (ALT 250 FOR IP)

## 2024-06-22 PROCEDURE — 99284 EMERGENCY DEPT VISIT MOD MDM: CPT

## 2024-06-22 PROCEDURE — 6360000002 HC RX W HCPCS: Performed by: EMERGENCY MEDICINE

## 2024-06-22 PROCEDURE — 90714 TD VACC NO PRESV 7 YRS+ IM: CPT | Performed by: EMERGENCY MEDICINE

## 2024-06-22 PROCEDURE — 90471 IMMUNIZATION ADMIN: CPT | Performed by: EMERGENCY MEDICINE

## 2024-06-22 PROCEDURE — 73630 X-RAY EXAM OF FOOT: CPT

## 2024-06-22 RX ORDER — CIPROFLOXACIN 500 MG/1
500 TABLET, FILM COATED ORAL 2 TIMES DAILY
Qty: 14 TABLET | Refills: 0 | Status: SHIPPED | OUTPATIENT
Start: 2024-06-22 | End: 2024-06-22

## 2024-06-22 RX ORDER — CIPROFLOXACIN 500 MG/1
500 TABLET, FILM COATED ORAL ONCE
Status: COMPLETED | OUTPATIENT
Start: 2024-06-22 | End: 2024-06-22

## 2024-06-22 RX ORDER — CIPROFLOXACIN 500 MG/1
500 TABLET, FILM COATED ORAL 2 TIMES DAILY
Qty: 14 TABLET | Refills: 0 | Status: SHIPPED | OUTPATIENT
Start: 2024-06-22 | End: 2024-06-29

## 2024-06-22 RX ADMIN — CLOSTRIDIUM TETANI TOXOID ANTIGEN (FORMALDEHYDE INACTIVATED) AND CORYNEBACTERIUM DIPHTHERIAE TOXOID ANTIGEN (FORMALDEHYDE INACTIVATED) 0.5 ML: 5; 2 INJECTION, SUSPENSION INTRAMUSCULAR at 08:06

## 2024-06-22 RX ADMIN — CIPROFLOXACIN 500 MG: 500 TABLET, FILM COATED ORAL at 08:07

## 2024-06-22 NOTE — ED PROVIDER NOTES
[facility] EMERGENCY DEPARTMENT  EMERGENCY DEPARTMENT ENCOUNTER        Pt Name: Red Gaviria  MRN: [id number]  Birthdate 1965  Date of evaluation: 6/22/2024  Provider: Khris Mcclelland MD  PCP: No primary care provider on file.  Note Started: 8:02 AM EDT 6/22/24    CHIEF COMPLAINT       Chief Complaint   Patient presents with    Puncture Wound     STEPPED ON METAL STAKE 8 PM RIGHT FOOT       HISTORY OF PRESENT ILLNESS: 1 or more Elements   History From: Patient.    Limitations to history : None    Red Gaviria is a 58 y.o. male with a past medical history of hypertension and GERD who presents to the ED with complaints of a puncture wound.  Patient states that he stepped on a metal stake at 8 PM last night, punctured his right foot.  Patient was wearing a sandal, patient states that the metal went through his sandal and punctured his right foot.  Patient is not sure about his tetanus status.  Patient denies any fever, headache, nausea, chills.  Patient denies any chest pain, shortness of breath, abdominal pain, constipation, diarrhea, urinary symptoms.    Nursing Notes were all reviewed and agreed with or any disagreements were addressed in the HPI.    ROS:   Pertinent positives and negatives are stated within HPI, all other systems reviewed and are negative.    --------------------------------------------- PAST HISTORY ---------------------------------------------  Past Medical History:  has a past medical history of GERD (gastroesophageal reflux disease), HTN (hypertension), and Vegetarian diet.    Past Surgical History:  has a past surgical history that includes Tonsillectomy.    Social History:  reports that he quit smoking about 14 years ago. His smoking use included cigarettes. He has never used smokeless tobacco. He reports that he does not currently use alcohol. He reports that he does not use drugs.    Family History: family history includes Alzheimer's Disease in his        DISPOSITION  Disposition: Discharge to home  Patient condition is stable        NOTE: This report was transcribed using voice recognition software. Every effort was made to ensure accuracy; however, inadvertent computerized transcription errors may be present

## 2025-02-05 ENCOUNTER — OFFICE VISIT (OUTPATIENT)
Dept: URGENT CARE | Age: 60
End: 2025-02-05
Payer: OTHER GOVERNMENT

## 2025-02-05 DIAGNOSIS — R05.9 COUGH, UNSPECIFIED TYPE: ICD-10-CM

## 2025-02-05 DIAGNOSIS — J10.1 INFLUENZA A: Primary | ICD-10-CM

## 2025-02-05 LAB
POC BINAX EXPIRATION: NORMAL
POC BINAX NOW COVID SERIAL NUMBER: NORMAL
POC RAPID INFLUENZA A: POSITIVE
POC RAPID INFLUENZA B: NEGATIVE
POC SARS-COV-2 AG BINAX: NORMAL

## 2025-02-05 RX ORDER — OSELTAMIVIR PHOSPHATE 75 MG/1
75 CAPSULE ORAL EVERY 12 HOURS
Qty: 10 CAPSULE | Refills: 0 | Status: SHIPPED | OUTPATIENT
Start: 2025-02-05 | End: 2025-02-10

## 2025-02-05 NOTE — PROGRESS NOTES
Subjective   Patient ID: Preston Bolanos is a 59 y.o. male. They present today with a chief complaint of Cough (Patient c/o cough, chills, headache. x24hrs).    History of Present Illness  HPI patient 59-year-old male with a history of hypertension who presents to the ED for cold and flu symptoms that been ongoing for the past day.  Patient endorses cough, chills, headache, states cough is non productive in nature.  Notes that he has some pain in his lungs when he coughs but otherwise denies any chest pain or shortness of breath.    Past Medical History  Allergies as of 02/05/2025    (No Known Allergies)       (Not in a hospital admission)       No past medical history on file.    No past surgical history on file.         Review of Systems  Review of Systems  Negative unless otherwise stated in HPI.     Objective    There were no vitals filed for this visit.  No LMP for male patient.    Physical Exam      VS: As documented in the triage note and EMR flowsheet from this visit was reviewed  General: Well appearing. No acute distress.   Eyes:  Extraocular movements grossly intact. No scleral icterus.   Head: Atraumatic. Normocephalic.     Neck: No meningismus. No gross masses. Full movement through range of motion  ENT: Posterior oropharynx shows no erythema, exudate or edema.  Uvula is midline without edema.  No stridor or trismus  CV: Regular rhythm. No murmurs, rubs, gallops appreciated.   Resp: Clear to auscultation bilaterally. No respiratory distress.    GI: Nontender. Soft. No masses. No rebound, rigidity or guarding.   MSK: Symmetric muscle bulk. No gross step offs or deformities.  Skin: Warm, dry. No rashes  Neuro: CN II-VII intact. A&O x3. Speech fluent. Alert. Moving all extremities. Ambulates with normal gait  Psych: Appropriate mood and affect for situation      Procedures    Point of Care Test & Imaging Results from this visit  Results for orders placed or performed in visit on 02/05/25   POCT Covid-19 Rapid  Antigen   Result Value Ref Range    Binax NOW Covid Serial Number n     BINAX NOW Covid Expiration n     POC RAFIA-COV-2 AG  Presumptive negative test for SARS-CoV-2 (no antigen detected)     Presumptive negative test for SARS-CoV-2 (no antigen detected)   POCT Influenza A/B manually resulted   Result Value Ref Range    POC Rapid Influenza A Positive (A) Negative    POC Rapid Influenza B Negative Negative      No results found.    Diagnostic study results (if any) were reviewed by Irene Coppola PA-C.    Assessment/Plan   Allergies, medications, history, and pertinent labs/EKGs/Imaging reviewed by Irene Coppola PA-C.     Medical Decision Making  Patient is a 59-year-old male who presents to the clinic for cold and flu symptoms.  Endorsing cough, headache, body aches and fever.  On examination, patient very well-appearing.  Cardiopulmonary examination without adventitious breath sounds.  Patient is positive for influenza A here.  Given he is in the 48-hour window we will proceed with Tamiflu which he was written a prescription for.  Advised on conservative management. Patient informed of the diagnosis.  Agreeable to the plan as discussed above.  Patient given the opportunity to ask questions.  All of the patient's questions were answered.       Orders and Diagnoses  Diagnoses and all orders for this visit:  Influenza A  Cough, unspecified type  -     POCT Covid-19 Rapid Antigen  -     POCT Influenza A/B manually resulted      Medical Admin Record      Patient disposition: Home    Electronically signed by Ireen Coppola PA-C  5:56 PM

## 2025-02-05 NOTE — LETTER
February 5, 2025     Patient: Preston Bolanos   YOB: 1965   Date of Visit: 2/5/2025       To Whom It May Concern:    Preston Bolanos was seen in my clinic on 2/5/2025 at 4:30 pm. Please excuse Preston for his absence from work on this day to make the appointment. Please excuse until 2/8/25 for influenza.     If you have any questions or concerns, please don't hesitate to call.         Sincerely,         Irene Coppola PA-C        CC: No Recipients